# Patient Record
Sex: FEMALE | Race: WHITE | NOT HISPANIC OR LATINO | Employment: OTHER | URBAN - NONMETROPOLITAN AREA
[De-identification: names, ages, dates, MRNs, and addresses within clinical notes are randomized per-mention and may not be internally consistent; named-entity substitution may affect disease eponyms.]

---

## 2018-05-22 ENCOUNTER — APPOINTMENT (OUTPATIENT)
Dept: GENERAL RADIOLOGY | Facility: HOSPITAL | Age: 63
End: 2018-05-22

## 2018-05-22 ENCOUNTER — HOSPITAL ENCOUNTER (EMERGENCY)
Facility: HOSPITAL | Age: 63
Discharge: HOME | End: 2018-05-23
Attending: EMERGENCY MEDICINE | Admitting: EMERGENCY MEDICINE

## 2018-05-22 DIAGNOSIS — I15.9 SECONDARY HYPERTENSION: Primary | ICD-10-CM

## 2018-05-22 LAB
ALBUMIN SERPL-MCNC: 4.3 G/DL (ref 3.5–5)
ALBUMIN/GLOB SERPL: 1.4 G/DL (ref 1–2)
ALP SERPL-CCNC: 82 U/L (ref 38–126)
ALT SERPL W P-5'-P-CCNC: 23 U/L (ref 13–69)
ANION GAP SERPL CALCULATED.3IONS-SCNC: 13.5 MMOL/L (ref 10–20)
AST SERPL-CCNC: 23 U/L (ref 15–46)
BASOPHILS # BLD AUTO: 0.04 10*3/MM3 (ref 0–0.2)
BASOPHILS NFR BLD AUTO: 0.4 % (ref 0–2.5)
BILIRUB SERPL-MCNC: 0.6 MG/DL (ref 0.2–1.3)
BUN BLD-MCNC: 16 MG/DL (ref 7–20)
BUN/CREAT SERPL: 14.5 (ref 7.1–23.5)
CALCIUM SPEC-SCNC: 9.6 MG/DL (ref 8.4–10.2)
CHLORIDE SERPL-SCNC: 105 MMOL/L (ref 98–107)
CO2 SERPL-SCNC: 27 MMOL/L (ref 26–30)
CREAT BLD-MCNC: 1.1 MG/DL (ref 0.6–1.3)
DEPRECATED RDW RBC AUTO: 43.4 FL (ref 37–54)
EOSINOPHIL # BLD AUTO: 0.09 10*3/MM3 (ref 0–0.7)
EOSINOPHIL NFR BLD AUTO: 1 % (ref 0–7)
ERYTHROCYTE [DISTWIDTH] IN BLOOD BY AUTOMATED COUNT: 13.4 % (ref 11.5–14.5)
GFR SERPL CREATININE-BSD FRML MDRD: 50 ML/MIN/1.73
GFR SERPL CREATININE-BSD FRML MDRD: 61 ML/MIN/1.73
GLOBULIN UR ELPH-MCNC: 3 GM/DL
GLUCOSE BLD-MCNC: 125 MG/DL (ref 74–98)
HCT VFR BLD AUTO: 37.2 % (ref 37–47)
HGB BLD-MCNC: 12.6 G/DL (ref 12–16)
IMM GRANULOCYTES # BLD: 0.03 10*3/MM3 (ref 0–0.06)
IMM GRANULOCYTES NFR BLD: 0.3 % (ref 0–0.6)
LYMPHOCYTES # BLD AUTO: 2.41 10*3/MM3 (ref 0.6–3.4)
LYMPHOCYTES NFR BLD AUTO: 26.9 % (ref 10–50)
MCH RBC QN AUTO: 29.9 PG (ref 27–31)
MCHC RBC AUTO-ENTMCNC: 33.9 G/DL (ref 30–37)
MCV RBC AUTO: 88.2 FL (ref 81–99)
MONOCYTES # BLD AUTO: 0.8 10*3/MM3 (ref 0–0.9)
MONOCYTES NFR BLD AUTO: 8.9 % (ref 0–12)
NEUTROPHILS # BLD AUTO: 5.6 10*3/MM3 (ref 2–6.9)
NEUTROPHILS NFR BLD AUTO: 62.5 % (ref 37–80)
NRBC BLD MANUAL-RTO: 0 /100 WBC (ref 0–0)
PLATELET # BLD AUTO: 224 10*3/MM3 (ref 130–400)
PMV BLD AUTO: 9.5 FL (ref 6–12)
POTASSIUM BLD-SCNC: 3.5 MMOL/L (ref 3.5–5.1)
PROT SERPL-MCNC: 7.3 G/DL (ref 6.3–8.2)
RBC # BLD AUTO: 4.22 10*6/MM3 (ref 4.2–5.4)
SODIUM BLD-SCNC: 142 MMOL/L (ref 137–145)
TROPONIN I SERPL-MCNC: 0 NG/ML (ref 0–0.05)
TROPONIN I SERPL-MCNC: <0.012 NG/ML (ref 0–0.03)
WBC NRBC COR # BLD: 8.97 10*3/MM3 (ref 4.8–10.8)

## 2018-05-22 PROCEDURE — 80053 COMPREHEN METABOLIC PANEL: CPT

## 2018-05-22 PROCEDURE — 71045 X-RAY EXAM CHEST 1 VIEW: CPT

## 2018-05-22 PROCEDURE — 93005 ELECTROCARDIOGRAM TRACING: CPT | Performed by: EMERGENCY MEDICINE

## 2018-05-22 PROCEDURE — 84484 ASSAY OF TROPONIN QUANT: CPT

## 2018-05-22 PROCEDURE — 99285 EMERGENCY DEPT VISIT HI MDM: CPT

## 2018-05-22 PROCEDURE — 85025 COMPLETE CBC W/AUTO DIFF WBC: CPT

## 2018-05-22 RX ORDER — SODIUM CHLORIDE 0.9 % (FLUSH) 0.9 %
10 SYRINGE (ML) INJECTION AS NEEDED
Status: DISCONTINUED | OUTPATIENT
Start: 2018-05-22 | End: 2018-05-23

## 2018-05-22 RX ORDER — AMLODIPINE BESYLATE 5 MG/1
5 TABLET ORAL
Status: DISCONTINUED | OUTPATIENT
Start: 2018-05-23 | End: 2018-05-22

## 2018-05-22 RX ORDER — AMLODIPINE BESYLATE 5 MG/1
5 TABLET ORAL ONCE
Status: COMPLETED | OUTPATIENT
Start: 2018-05-22 | End: 2018-05-22

## 2018-05-22 RX ORDER — LISINOPRIL 10 MG/1
10 TABLET ORAL DAILY
COMMUNITY
End: 2018-05-23

## 2018-05-22 RX ORDER — METOPROLOL SUCCINATE 50 MG/1
50 TABLET, EXTENDED RELEASE ORAL DAILY
COMMUNITY
End: 2018-05-23

## 2018-05-22 RX ADMIN — AMLODIPINE BESYLATE 5 MG: 5 TABLET ORAL at 23:18

## 2018-05-23 VITALS
BODY MASS INDEX: 33.54 KG/M2 | HEIGHT: 71 IN | SYSTOLIC BLOOD PRESSURE: 163 MMHG | RESPIRATION RATE: 18 BRPM | DIASTOLIC BLOOD PRESSURE: 92 MMHG | OXYGEN SATURATION: 96 % | WEIGHT: 239.6 LBS | HEART RATE: 85 BPM | TEMPERATURE: 98.5 F

## 2018-05-23 LAB
HOLD SPECIMEN: NORMAL
HOLD SPECIMEN: NORMAL
WHOLE BLOOD HOLD SPECIMEN: NORMAL
WHOLE BLOOD HOLD SPECIMEN: NORMAL

## 2018-08-21 ENCOUNTER — TRANSCRIBE ORDERS (OUTPATIENT)
Dept: ADMINISTRATIVE | Facility: HOSPITAL | Age: 63
End: 2018-08-21

## 2018-08-21 ENCOUNTER — APPOINTMENT (OUTPATIENT)
Dept: LAB | Facility: HOSPITAL | Age: 63
End: 2018-08-21

## 2018-08-21 DIAGNOSIS — I13.10 BENIGN HYPERTENSIVE HEART AND RENAL DISEASE: Primary | ICD-10-CM

## 2018-08-21 LAB
ALBUMIN SERPL-MCNC: 4 G/DL (ref 3.5–5)
ANION GAP SERPL CALCULATED.3IONS-SCNC: 13.4 MMOL/L (ref 10–20)
BACTERIA UR QL AUTO: ABNORMAL /HPF
BASOPHILS # BLD AUTO: 0.04 10*3/MM3 (ref 0–0.2)
BASOPHILS NFR BLD AUTO: 0.8 % (ref 0–2.5)
BILIRUB UR QL STRIP: NEGATIVE
BUN BLD-MCNC: 19 MG/DL (ref 7–20)
BUN/CREAT SERPL: 19 (ref 7.1–23.5)
CALCIUM SPEC-SCNC: 9.3 MG/DL (ref 8.4–10.2)
CHLORIDE SERPL-SCNC: 104 MMOL/L (ref 98–107)
CLARITY UR: CLEAR
CO2 SERPL-SCNC: 29 MMOL/L (ref 26–30)
COLOR UR: YELLOW
CREAT BLD-MCNC: 1 MG/DL (ref 0.6–1.3)
CREAT UR-MCNC: 21.6 MG/DL
DEPRECATED RDW RBC AUTO: 42.9 FL (ref 37–54)
EOSINOPHIL # BLD AUTO: 0.13 10*3/MM3 (ref 0–0.7)
EOSINOPHIL NFR BLD AUTO: 2.6 % (ref 0–7)
ERYTHROCYTE [DISTWIDTH] IN BLOOD BY AUTOMATED COUNT: 13 % (ref 11.5–14.5)
GFR SERPL CREATININE-BSD FRML MDRD: 56 ML/MIN/1.73
GLUCOSE BLD-MCNC: 84 MG/DL (ref 74–98)
GLUCOSE UR STRIP-MCNC: NEGATIVE MG/DL
HCT VFR BLD AUTO: 38.8 % (ref 37–47)
HGB BLD-MCNC: 12.8 G/DL (ref 12–16)
HGB UR QL STRIP.AUTO: ABNORMAL
HYALINE CASTS UR QL AUTO: ABNORMAL /LPF
IMM GRANULOCYTES # BLD: 0.02 10*3/MM3 (ref 0–0.06)
IMM GRANULOCYTES NFR BLD: 0.4 % (ref 0–0.6)
KETONES UR QL STRIP: NEGATIVE
LEUKOCYTE ESTERASE UR QL STRIP.AUTO: ABNORMAL
LYMPHOCYTES # BLD AUTO: 1.76 10*3/MM3 (ref 0.6–3.4)
LYMPHOCYTES NFR BLD AUTO: 35.1 % (ref 10–50)
MCH RBC QN AUTO: 29.9 PG (ref 27–31)
MCHC RBC AUTO-ENTMCNC: 33 G/DL (ref 30–37)
MCV RBC AUTO: 90.7 FL (ref 81–99)
MONOCYTES # BLD AUTO: 0.49 10*3/MM3 (ref 0–0.9)
MONOCYTES NFR BLD AUTO: 9.8 % (ref 0–12)
NEUTROPHILS # BLD AUTO: 2.58 10*3/MM3 (ref 2–6.9)
NEUTROPHILS NFR BLD AUTO: 51.3 % (ref 37–80)
NITRITE UR QL STRIP: NEGATIVE
NRBC BLD MANUAL-RTO: 0 /100 WBC (ref 0–0)
PH UR STRIP.AUTO: 5.5 [PH] (ref 5–8)
PHOSPHATE SERPL-MCNC: 3.6 MG/DL (ref 2.5–4.5)
PLATELET # BLD AUTO: 219 10*3/MM3 (ref 130–400)
PMV BLD AUTO: 9.3 FL (ref 6–12)
POTASSIUM BLD-SCNC: 4.4 MMOL/L (ref 3.5–5.1)
PROT UR QL STRIP: NEGATIVE
PROT UR-MCNC: 13 MG/DL (ref 0–11.9)
RBC # BLD AUTO: 4.28 10*6/MM3 (ref 4.2–5.4)
RBC # UR: ABNORMAL /HPF
REF LAB TEST METHOD: ABNORMAL
SODIUM BLD-SCNC: 142 MMOL/L (ref 137–145)
SP GR UR STRIP: <=1.005 (ref 1–1.03)
SQUAMOUS #/AREA URNS HPF: ABNORMAL /HPF
UROBILINOGEN UR QL STRIP: ABNORMAL
WBC NRBC COR # BLD: 5.02 10*3/MM3 (ref 4.8–10.8)
WBC UR QL AUTO: ABNORMAL /HPF

## 2018-08-21 PROCEDURE — 82570 ASSAY OF URINE CREATININE: CPT | Performed by: INTERNAL MEDICINE

## 2018-08-21 PROCEDURE — 84156 ASSAY OF PROTEIN URINE: CPT | Performed by: INTERNAL MEDICINE

## 2018-08-21 PROCEDURE — 85025 COMPLETE CBC W/AUTO DIFF WBC: CPT | Performed by: INTERNAL MEDICINE

## 2018-08-21 PROCEDURE — 36415 COLL VENOUS BLD VENIPUNCTURE: CPT | Performed by: INTERNAL MEDICINE

## 2018-08-21 PROCEDURE — 81001 URINALYSIS AUTO W/SCOPE: CPT | Performed by: INTERNAL MEDICINE

## 2018-08-21 PROCEDURE — 80069 RENAL FUNCTION PANEL: CPT | Performed by: INTERNAL MEDICINE

## 2018-10-29 ENCOUNTER — HOSPITAL ENCOUNTER (EMERGENCY)
Facility: HOSPITAL | Age: 63
Discharge: HOME | End: 2018-10-29
Attending: EMERGENCY MEDICINE | Admitting: EMERGENCY MEDICINE

## 2018-10-29 ENCOUNTER — APPOINTMENT (OUTPATIENT)
Dept: CT IMAGING | Facility: HOSPITAL | Age: 63
End: 2018-10-29
Attending: EMERGENCY MEDICINE

## 2018-10-29 VITALS
OXYGEN SATURATION: 97 % | BODY MASS INDEX: 34.5 KG/M2 | SYSTOLIC BLOOD PRESSURE: 147 MMHG | HEIGHT: 70 IN | WEIGHT: 241 LBS | RESPIRATION RATE: 18 BRPM | DIASTOLIC BLOOD PRESSURE: 80 MMHG | TEMPERATURE: 99 F | HEART RATE: 84 BPM

## 2018-10-29 DIAGNOSIS — K04.7 DENTAL ABSCESS: Primary | ICD-10-CM

## 2018-10-29 LAB
ALBUMIN SERPL-MCNC: 4.3 G/DL (ref 3.5–5)
ALBUMIN/GLOB SERPL: 1.3 G/DL (ref 1–2)
ALP SERPL-CCNC: 80 U/L (ref 38–126)
ALT SERPL W P-5'-P-CCNC: 24 U/L (ref 13–69)
ANION GAP SERPL CALCULATED.3IONS-SCNC: 10.8 MMOL/L (ref 10–20)
AST SERPL-CCNC: 23 U/L (ref 15–46)
BASOPHILS # BLD AUTO: 0.04 10*3/MM3 (ref 0–0.2)
BASOPHILS NFR BLD AUTO: 0.5 % (ref 0–2.5)
BILIRUB SERPL-MCNC: 1.2 MG/DL (ref 0.2–1.3)
BUN BLD-MCNC: 13 MG/DL (ref 7–20)
BUN/CREAT SERPL: 11.8 (ref 7.1–23.5)
CALCIUM SPEC-SCNC: 9.4 MG/DL (ref 8.4–10.2)
CHLORIDE SERPL-SCNC: 104 MMOL/L (ref 98–107)
CO2 SERPL-SCNC: 28 MMOL/L (ref 26–30)
CREAT BLD-MCNC: 1.1 MG/DL (ref 0.6–1.3)
DEPRECATED RDW RBC AUTO: 43.3 FL (ref 37–54)
EOSINOPHIL # BLD AUTO: 0.15 10*3/MM3 (ref 0–0.7)
EOSINOPHIL NFR BLD AUTO: 1.9 % (ref 0–7)
ERYTHROCYTE [DISTWIDTH] IN BLOOD BY AUTOMATED COUNT: 13.3 % (ref 11.5–14.5)
GFR SERPL CREATININE-BSD FRML MDRD: 50 ML/MIN/1.73
GLOBULIN UR ELPH-MCNC: 3.3 GM/DL
GLUCOSE BLD-MCNC: 115 MG/DL (ref 74–98)
HCT VFR BLD AUTO: 37.5 % (ref 37–47)
HGB BLD-MCNC: 12.3 G/DL (ref 12–16)
IMM GRANULOCYTES # BLD: 0.04 10*3/MM3 (ref 0–0.06)
IMM GRANULOCYTES NFR BLD: 0.5 % (ref 0–0.6)
LYMPHOCYTES # BLD AUTO: 1.75 10*3/MM3 (ref 0.6–3.4)
LYMPHOCYTES NFR BLD AUTO: 21.9 % (ref 10–50)
MCH RBC QN AUTO: 29.1 PG (ref 27–31)
MCHC RBC AUTO-ENTMCNC: 32.8 G/DL (ref 30–37)
MCV RBC AUTO: 88.9 FL (ref 81–99)
MONOCYTES # BLD AUTO: 0.92 10*3/MM3 (ref 0–0.9)
MONOCYTES NFR BLD AUTO: 11.5 % (ref 0–12)
NEUTROPHILS # BLD AUTO: 5.09 10*3/MM3 (ref 2–6.9)
NEUTROPHILS NFR BLD AUTO: 63.7 % (ref 37–80)
NRBC BLD MANUAL-RTO: 0 /100 WBC (ref 0–0)
PLATELET # BLD AUTO: 203 10*3/MM3 (ref 130–400)
PMV BLD AUTO: 9.3 FL (ref 6–12)
POTASSIUM BLD-SCNC: 3.8 MMOL/L (ref 3.5–5.1)
PROT SERPL-MCNC: 7.6 G/DL (ref 6.3–8.2)
RBC # BLD AUTO: 4.22 10*6/MM3 (ref 4.2–5.4)
SODIUM BLD-SCNC: 139 MMOL/L (ref 137–145)
WBC NRBC COR # BLD: 7.99 10*3/MM3 (ref 4.8–10.8)

## 2018-10-29 PROCEDURE — 25010000002 KETOROLAC TROMETHAMINE PER 15 MG: Performed by: EMERGENCY MEDICINE

## 2018-10-29 PROCEDURE — 96365 THER/PROPH/DIAG IV INF INIT: CPT

## 2018-10-29 PROCEDURE — 70487 CT MAXILLOFACIAL W/DYE: CPT

## 2018-10-29 PROCEDURE — 25010000002 IOPAMIDOL 61 % SOLUTION: Performed by: EMERGENCY MEDICINE

## 2018-10-29 PROCEDURE — 25010000002 DEXAMETHASONE PER 1 MG: Performed by: EMERGENCY MEDICINE

## 2018-10-29 PROCEDURE — 96361 HYDRATE IV INFUSION ADD-ON: CPT

## 2018-10-29 PROCEDURE — 85025 COMPLETE CBC W/AUTO DIFF WBC: CPT | Performed by: EMERGENCY MEDICINE

## 2018-10-29 PROCEDURE — 96375 TX/PRO/DX INJ NEW DRUG ADDON: CPT

## 2018-10-29 PROCEDURE — 80053 COMPREHEN METABOLIC PANEL: CPT | Performed by: EMERGENCY MEDICINE

## 2018-10-29 PROCEDURE — 99284 EMERGENCY DEPT VISIT MOD MDM: CPT

## 2018-10-29 RX ORDER — CLINDAMYCIN HYDROCHLORIDE 150 MG/1
450 CAPSULE ORAL 3 TIMES DAILY
Qty: 90 CAPSULE | Refills: 0 | Status: SHIPPED | OUTPATIENT
Start: 2018-10-29 | End: 2018-11-08

## 2018-10-29 RX ORDER — LISINOPRIL 10 MG/1
10 TABLET ORAL 2 TIMES DAILY
COMMUNITY
End: 2021-04-14 | Stop reason: SDUPTHER

## 2018-10-29 RX ORDER — KETOROLAC TROMETHAMINE 30 MG/ML
15 INJECTION, SOLUTION INTRAMUSCULAR; INTRAVENOUS ONCE
Status: COMPLETED | OUTPATIENT
Start: 2018-10-29 | End: 2018-10-29

## 2018-10-29 RX ORDER — CARVEDILOL 25 MG/1
25 TABLET ORAL 2 TIMES DAILY WITH MEALS
COMMUNITY
End: 2021-04-14 | Stop reason: SDUPTHER

## 2018-10-29 RX ORDER — DEXAMETHASONE SODIUM PHOSPHATE 4 MG/ML
10 INJECTION, SOLUTION INTRA-ARTICULAR; INTRALESIONAL; INTRAMUSCULAR; INTRAVENOUS; SOFT TISSUE ONCE
Status: COMPLETED | OUTPATIENT
Start: 2018-10-29 | End: 2018-10-29

## 2018-10-29 RX ORDER — CLINDAMYCIN PHOSPHATE 600 MG/50ML
600 INJECTION, SOLUTION INTRAVENOUS ONCE
Status: COMPLETED | OUTPATIENT
Start: 2018-10-29 | End: 2018-10-29

## 2018-10-29 RX ADMIN — CLINDAMYCIN PHOSPHATE 600 MG: 600 INJECTION, SOLUTION INTRAVENOUS at 11:07

## 2018-10-29 RX ADMIN — KETOROLAC TROMETHAMINE 15 MG: 30 INJECTION, SOLUTION INTRAMUSCULAR at 11:05

## 2018-10-29 RX ADMIN — DEXAMETHASONE SODIUM PHOSPHATE 10 MG: 4 INJECTION, SOLUTION INTRA-ARTICULAR; INTRALESIONAL; INTRAMUSCULAR; INTRAVENOUS; SOFT TISSUE at 11:05

## 2018-10-29 RX ADMIN — IOPAMIDOL 100 ML: 612 INJECTION, SOLUTION INTRAVENOUS at 12:07

## 2018-10-29 RX ADMIN — SODIUM CHLORIDE 1000 ML: 9 INJECTION, SOLUTION INTRAVENOUS at 11:03

## 2018-11-16 ENCOUNTER — TRANSCRIBE ORDERS (OUTPATIENT)
Dept: ULTRASOUND IMAGING | Facility: HOSPITAL | Age: 63
End: 2018-11-16

## 2018-11-16 DIAGNOSIS — I10 ESSENTIAL HYPERTENSION, MALIGNANT: Primary | ICD-10-CM

## 2018-11-16 DIAGNOSIS — N18.2 CHRONIC KIDNEY DISEASE, STAGE II (MILD): ICD-10-CM

## 2018-12-05 ENCOUNTER — HOSPITAL ENCOUNTER (OUTPATIENT)
Dept: ULTRASOUND IMAGING | Facility: HOSPITAL | Age: 63
Discharge: HOME | End: 2018-12-05
Admitting: INTERNAL MEDICINE

## 2018-12-05 DIAGNOSIS — I10 ESSENTIAL HYPERTENSION, MALIGNANT: ICD-10-CM

## 2018-12-05 DIAGNOSIS — N18.2 CHRONIC KIDNEY DISEASE, STAGE II (MILD): ICD-10-CM

## 2018-12-05 PROCEDURE — 76775 US EXAM ABDO BACK WALL LIM: CPT

## 2019-08-05 ENCOUNTER — HOSPITAL ENCOUNTER (EMERGENCY)
Facility: HOSPITAL | Age: 64
Discharge: HOME | End: 2019-08-05
Attending: EMERGENCY MEDICINE | Admitting: EMERGENCY MEDICINE

## 2019-08-05 ENCOUNTER — APPOINTMENT (OUTPATIENT)
Dept: GENERAL RADIOLOGY | Facility: HOSPITAL | Age: 64
End: 2019-08-05

## 2019-08-05 VITALS
BODY MASS INDEX: 36.05 KG/M2 | HEART RATE: 88 BPM | RESPIRATION RATE: 17 BRPM | DIASTOLIC BLOOD PRESSURE: 82 MMHG | WEIGHT: 251.8 LBS | SYSTOLIC BLOOD PRESSURE: 150 MMHG | HEIGHT: 70 IN | TEMPERATURE: 97.8 F | OXYGEN SATURATION: 98 %

## 2019-08-05 DIAGNOSIS — S20.211A RIB CONTUSION, RIGHT, INITIAL ENCOUNTER: Primary | ICD-10-CM

## 2019-08-05 PROCEDURE — 71101 X-RAY EXAM UNILAT RIBS/CHEST: CPT

## 2019-08-05 PROCEDURE — 99283 EMERGENCY DEPT VISIT LOW MDM: CPT

## 2019-08-05 RX ORDER — TRAMADOL HYDROCHLORIDE 50 MG/1
50 TABLET ORAL EVERY 6 HOURS PRN
Qty: 12 TABLET | Refills: 0 | Status: SHIPPED | OUTPATIENT
Start: 2019-08-05 | End: 2019-08-10 | Stop reason: HOSPADM

## 2019-08-05 RX ORDER — HYDROCODONE BITARTRATE AND ACETAMINOPHEN 5; 325 MG/1; MG/1
1 TABLET ORAL ONCE
Status: COMPLETED | OUTPATIENT
Start: 2019-08-05 | End: 2019-08-05

## 2019-08-05 RX ADMIN — HYDROCODONE BITARTRATE AND ACETAMINOPHEN 1 TABLET: 5; 325 TABLET ORAL at 13:36

## 2019-08-08 ENCOUNTER — APPOINTMENT (OUTPATIENT)
Dept: CT IMAGING | Facility: HOSPITAL | Age: 64
End: 2019-08-08

## 2019-08-08 ENCOUNTER — HOSPITAL ENCOUNTER (INPATIENT)
Facility: HOSPITAL | Age: 64
LOS: 2 days | Discharge: HOME | End: 2019-08-10
Attending: EMERGENCY MEDICINE | Admitting: INTERNAL MEDICINE

## 2019-08-08 ENCOUNTER — APPOINTMENT (OUTPATIENT)
Dept: GENERAL RADIOLOGY | Facility: HOSPITAL | Age: 64
End: 2019-08-08

## 2019-08-08 ENCOUNTER — APPOINTMENT (OUTPATIENT)
Dept: NUCLEAR MEDICINE | Facility: HOSPITAL | Age: 64
End: 2019-08-08

## 2019-08-08 ENCOUNTER — APPOINTMENT (OUTPATIENT)
Dept: CARDIOLOGY | Facility: HOSPITAL | Age: 64
End: 2019-08-08

## 2019-08-08 DIAGNOSIS — N17.9 ACUTE KIDNEY INJURY (HCC): ICD-10-CM

## 2019-08-08 DIAGNOSIS — I21.4 NSTEMI (NON-ST ELEVATED MYOCARDIAL INFARCTION) (HCC): Primary | ICD-10-CM

## 2019-08-08 PROBLEM — J18.9 PNEUMONIA OF RIGHT MIDDLE LOBE DUE TO INFECTIOUS ORGANISM: Status: ACTIVE | Noted: 2019-08-08

## 2019-08-08 LAB
ALBUMIN SERPL-MCNC: 3.5 G/DL (ref 3.5–5.2)
ALBUMIN/GLOB SERPL: 0.9 G/DL
ALP SERPL-CCNC: 95 U/L (ref 39–117)
ALT SERPL W P-5'-P-CCNC: 8 U/L (ref 1–33)
ANION GAP SERPL CALCULATED.3IONS-SCNC: 19.7 MMOL/L (ref 5–15)
AST SERPL-CCNC: 17 U/L (ref 1–32)
BASOPHILS # BLD AUTO: 0.02 10*3/MM3 (ref 0–0.2)
BASOPHILS NFR BLD AUTO: 0.1 % (ref 0–1.5)
BILIRUB SERPL-MCNC: 1.8 MG/DL (ref 0.2–1.2)
BUN BLD-MCNC: 35 MG/DL (ref 8–23)
BUN/CREAT SERPL: 18.4 (ref 7–25)
CALCIUM SPEC-SCNC: 9.4 MG/DL (ref 8.6–10.5)
CHLORIDE SERPL-SCNC: 99 MMOL/L (ref 98–107)
CK SERPL-CCNC: 66 U/L (ref 20–180)
CO2 SERPL-SCNC: 20.8 MMOL/L (ref 22–29)
CREAT BLD-MCNC: 1.9 MG/DL (ref 0.57–1)
D DIMER PPP FEU-MCNC: 5.1 MCGFEU/ML (ref 0–0.57)
D-LACTATE SERPL-SCNC: 2.7 MMOL/L (ref 0.5–2)
DEPRECATED RDW RBC AUTO: 41.5 FL (ref 37–54)
EOSINOPHIL # BLD AUTO: 0.04 10*3/MM3 (ref 0–0.4)
EOSINOPHIL NFR BLD AUTO: 0.3 % (ref 0.3–6.2)
ERYTHROCYTE [DISTWIDTH] IN BLOOD BY AUTOMATED COUNT: 12.8 % (ref 12.3–15.4)
GFR SERPL CREATININE-BSD FRML MDRD: 27 ML/MIN/1.73
GLOBULIN UR ELPH-MCNC: 4 GM/DL
GLUCOSE BLD-MCNC: 156 MG/DL (ref 65–99)
HBA1C MFR BLD: 5.3 % (ref 4.8–5.6)
HCT VFR BLD AUTO: 35.4 % (ref 34–46.6)
HGB BLD-MCNC: 11.6 G/DL (ref 12–15.9)
HOLD SPECIMEN: NORMAL
IMM GRANULOCYTES # BLD AUTO: 0.1 10*3/MM3 (ref 0–0.05)
IMM GRANULOCYTES NFR BLD AUTO: 0.7 % (ref 0–0.5)
LYMPHOCYTES # BLD AUTO: 1.58 10*3/MM3 (ref 0.7–3.1)
LYMPHOCYTES NFR BLD AUTO: 11.7 % (ref 19.6–45.3)
MAXIMAL PREDICTED HEART RATE: 158 BPM
MCH RBC QN AUTO: 28.6 PG (ref 26.6–33)
MCHC RBC AUTO-ENTMCNC: 32.8 G/DL (ref 31.5–35.7)
MCV RBC AUTO: 87.4 FL (ref 79–97)
MONOCYTES # BLD AUTO: 1.42 10*3/MM3 (ref 0.1–0.9)
MONOCYTES NFR BLD AUTO: 10.5 % (ref 5–12)
NEUTROPHILS # BLD AUTO: 10.35 10*3/MM3 (ref 1.7–7)
NEUTROPHILS NFR BLD AUTO: 76.7 % (ref 42.7–76)
NRBC BLD AUTO-RTO: 0 /100 WBC (ref 0–0.2)
NT-PROBNP SERPL-MCNC: 9384 PG/ML (ref 5–900)
PLATELET # BLD AUTO: 231 10*3/MM3 (ref 140–450)
PMV BLD AUTO: 9.6 FL (ref 6–12)
POTASSIUM BLD-SCNC: 4.5 MMOL/L (ref 3.5–5.2)
PROCALCITONIN SERPL-MCNC: 1.26 NG/ML (ref 0.1–0.25)
PROT SERPL-MCNC: 7.5 G/DL (ref 6–8.5)
RBC # BLD AUTO: 4.05 10*6/MM3 (ref 3.77–5.28)
SODIUM BLD-SCNC: 135 MMOL/L (ref 136–145)
STRESS TARGET HR: 134 BPM
TROPONIN I SERPL-MCNC: 0.8 NG/ML (ref 0–0.05)
TROPONIN T SERPL-MCNC: 0.1 NG/ML (ref 0–0.03)
TROPONIN T SERPL-MCNC: 0.16 NG/ML (ref 0–0.03)
TSH SERPL DL<=0.05 MIU/L-ACNC: 2.99 MIU/ML (ref 0.27–4.2)
WBC NRBC COR # BLD: 13.51 10*3/MM3 (ref 3.4–10.8)
WHOLE BLOOD HOLD SPECIMEN: NORMAL
WHOLE BLOOD HOLD SPECIMEN: NORMAL

## 2019-08-08 PROCEDURE — 93005 ELECTROCARDIOGRAM TRACING: CPT

## 2019-08-08 PROCEDURE — 0 TECHNETIUM ALBUMIN AGGREGATED: Performed by: INTERNAL MEDICINE

## 2019-08-08 PROCEDURE — 71046 X-RAY EXAM CHEST 2 VIEWS: CPT

## 2019-08-08 PROCEDURE — 87040 BLOOD CULTURE FOR BACTERIA: CPT | Performed by: FAMILY MEDICINE

## 2019-08-08 PROCEDURE — 25010000002 FENTANYL CITRATE (PF) 100 MCG/2ML SOLUTION: Performed by: EMERGENCY MEDICINE

## 2019-08-08 PROCEDURE — 85025 COMPLETE CBC W/AUTO DIFF WBC: CPT

## 2019-08-08 PROCEDURE — 99284 EMERGENCY DEPT VISIT MOD MDM: CPT

## 2019-08-08 PROCEDURE — 83605 ASSAY OF LACTIC ACID: CPT | Performed by: INTERNAL MEDICINE

## 2019-08-08 PROCEDURE — A9540 TC99M MAA: HCPCS | Performed by: INTERNAL MEDICINE

## 2019-08-08 PROCEDURE — 25010000002 ENOXAPARIN PER 10 MG: Performed by: EMERGENCY MEDICINE

## 2019-08-08 PROCEDURE — 94799 UNLISTED PULMONARY SVC/PX: CPT

## 2019-08-08 PROCEDURE — 80053 COMPREHEN METABOLIC PANEL: CPT

## 2019-08-08 PROCEDURE — 84145 PROCALCITONIN (PCT): CPT | Performed by: INTERNAL MEDICINE

## 2019-08-08 PROCEDURE — 84484 ASSAY OF TROPONIN QUANT: CPT | Performed by: INTERNAL MEDICINE

## 2019-08-08 PROCEDURE — 99223 1ST HOSP IP/OBS HIGH 75: CPT | Performed by: INTERNAL MEDICINE

## 2019-08-08 PROCEDURE — 71250 CT THORAX DX C-: CPT

## 2019-08-08 PROCEDURE — 93306 TTE W/DOPPLER COMPLETE: CPT

## 2019-08-08 PROCEDURE — 82550 ASSAY OF CK (CPK): CPT | Performed by: INTERNAL MEDICINE

## 2019-08-08 PROCEDURE — 84443 ASSAY THYROID STIM HORMONE: CPT | Performed by: INTERNAL MEDICINE

## 2019-08-08 PROCEDURE — 78582 LUNG VENTILAT&PERFUS IMAGING: CPT

## 2019-08-08 PROCEDURE — 94640 AIRWAY INHALATION TREATMENT: CPT

## 2019-08-08 PROCEDURE — 25010000002 LEVOFLOXACIN PER 250 MG: Performed by: INTERNAL MEDICINE

## 2019-08-08 PROCEDURE — 85379 FIBRIN DEGRADATION QUANT: CPT | Performed by: INTERNAL MEDICINE

## 2019-08-08 PROCEDURE — 84484 ASSAY OF TROPONIN QUANT: CPT

## 2019-08-08 PROCEDURE — 83880 ASSAY OF NATRIURETIC PEPTIDE: CPT | Performed by: INTERNAL MEDICINE

## 2019-08-08 PROCEDURE — 83036 HEMOGLOBIN GLYCOSYLATED A1C: CPT | Performed by: INTERNAL MEDICINE

## 2019-08-08 RX ORDER — SODIUM CHLORIDE 9 MG/ML
100 INJECTION, SOLUTION INTRAVENOUS CONTINUOUS
Status: DISCONTINUED | OUTPATIENT
Start: 2019-08-08 | End: 2019-08-10

## 2019-08-08 RX ORDER — BENZONATATE 100 MG/1
100 CAPSULE ORAL 3 TIMES DAILY PRN
Status: DISCONTINUED | OUTPATIENT
Start: 2019-08-08 | End: 2019-08-10 | Stop reason: HOSPADM

## 2019-08-08 RX ORDER — SODIUM CHLORIDE FOR INHALATION 3 %
4 VIAL, NEBULIZER (ML) INHALATION ONCE
Status: COMPLETED | OUTPATIENT
Start: 2019-08-08 | End: 2019-08-08

## 2019-08-08 RX ORDER — ONDANSETRON 2 MG/ML
4 INJECTION INTRAMUSCULAR; INTRAVENOUS EVERY 6 HOURS PRN
Status: DISCONTINUED | OUTPATIENT
Start: 2019-08-08 | End: 2019-08-10 | Stop reason: HOSPADM

## 2019-08-08 RX ORDER — ASCORBIC ACID 500 MG
500 TABLET ORAL DAILY
Status: DISCONTINUED | OUTPATIENT
Start: 2019-08-09 | End: 2019-08-10 | Stop reason: HOSPADM

## 2019-08-08 RX ORDER — SODIUM CHLORIDE 0.9 % (FLUSH) 0.9 %
10 SYRINGE (ML) INJECTION AS NEEDED
Status: DISCONTINUED | OUTPATIENT
Start: 2019-08-08 | End: 2019-08-10 | Stop reason: HOSPADM

## 2019-08-08 RX ORDER — ASPIRIN 325 MG
325 TABLET ORAL ONCE
Status: COMPLETED | OUTPATIENT
Start: 2019-08-08 | End: 2019-08-08

## 2019-08-08 RX ORDER — CHOLECALCIFEROL (VITAMIN D3) 125 MCG
500 CAPSULE ORAL DAILY
Status: DISCONTINUED | OUTPATIENT
Start: 2019-08-09 | End: 2019-08-10 | Stop reason: HOSPADM

## 2019-08-08 RX ORDER — ASPIRIN 81 MG/1
81 TABLET ORAL DAILY
Status: DISCONTINUED | OUTPATIENT
Start: 2019-08-09 | End: 2019-08-10 | Stop reason: HOSPADM

## 2019-08-08 RX ORDER — ONDANSETRON 4 MG/1
4 TABLET, FILM COATED ORAL EVERY 6 HOURS PRN
Status: DISCONTINUED | OUTPATIENT
Start: 2019-08-08 | End: 2019-08-10 | Stop reason: HOSPADM

## 2019-08-08 RX ORDER — ALUMINA, MAGNESIA, AND SIMETHICONE 2400; 2400; 240 MG/30ML; MG/30ML; MG/30ML
15 SUSPENSION ORAL EVERY 6 HOURS PRN
Status: DISCONTINUED | OUTPATIENT
Start: 2019-08-08 | End: 2019-08-10 | Stop reason: HOSPADM

## 2019-08-08 RX ORDER — L.ACID,PARA/B.BIFIDUM/S.THERM 8B CELL
1 CAPSULE ORAL 3 TIMES DAILY
Status: DISCONTINUED | OUTPATIENT
Start: 2019-08-08 | End: 2019-08-10 | Stop reason: HOSPADM

## 2019-08-08 RX ORDER — ASCORBIC ACID 500 MG
500 TABLET ORAL DAILY
COMMUNITY

## 2019-08-08 RX ORDER — CHOLECALCIFEROL (VITAMIN D3) 125 MCG
5 CAPSULE ORAL NIGHTLY PRN
Status: DISCONTINUED | OUTPATIENT
Start: 2019-08-08 | End: 2019-08-10 | Stop reason: HOSPADM

## 2019-08-08 RX ORDER — FENTANYL CITRATE 50 UG/ML
50 INJECTION, SOLUTION INTRAMUSCULAR; INTRAVENOUS ONCE
Status: COMPLETED | OUTPATIENT
Start: 2019-08-08 | End: 2019-08-08

## 2019-08-08 RX ORDER — IPRATROPIUM BROMIDE AND ALBUTEROL SULFATE 2.5; .5 MG/3ML; MG/3ML
3 SOLUTION RESPIRATORY (INHALATION)
Status: DISCONTINUED | OUTPATIENT
Start: 2019-08-08 | End: 2019-08-10 | Stop reason: HOSPADM

## 2019-08-08 RX ORDER — CARVEDILOL 25 MG/1
25 TABLET ORAL 2 TIMES DAILY WITH MEALS
Status: DISCONTINUED | OUTPATIENT
Start: 2019-08-08 | End: 2019-08-10 | Stop reason: HOSPADM

## 2019-08-08 RX ORDER — ACETAMINOPHEN 325 MG/1
650 TABLET ORAL EVERY 4 HOURS PRN
Status: DISCONTINUED | OUTPATIENT
Start: 2019-08-08 | End: 2019-08-10 | Stop reason: HOSPADM

## 2019-08-08 RX ORDER — LEVOFLOXACIN 5 MG/ML
250 INJECTION, SOLUTION INTRAVENOUS EVERY 24 HOURS
Status: DISCONTINUED | OUTPATIENT
Start: 2019-08-09 | End: 2019-08-09

## 2019-08-08 RX ORDER — POLYETHYLENE GLYCOL 3350 17 G/17G
17 POWDER, FOR SOLUTION ORAL DAILY PRN
Status: DISCONTINUED | OUTPATIENT
Start: 2019-08-08 | End: 2019-08-10 | Stop reason: HOSPADM

## 2019-08-08 RX ORDER — CHOLECALCIFEROL (VITAMIN D3) 125 MCG
500 CAPSULE ORAL DAILY
COMMUNITY

## 2019-08-08 RX ORDER — IPRATROPIUM BROMIDE AND ALBUTEROL SULFATE 2.5; .5 MG/3ML; MG/3ML
3 SOLUTION RESPIRATORY (INHALATION) EVERY 4 HOURS PRN
Status: DISCONTINUED | OUTPATIENT
Start: 2019-08-08 | End: 2019-08-10 | Stop reason: HOSPADM

## 2019-08-08 RX ORDER — LEVOFLOXACIN 5 MG/ML
500 INJECTION, SOLUTION INTRAVENOUS ONCE
Status: COMPLETED | OUTPATIENT
Start: 2019-08-08 | End: 2019-08-08

## 2019-08-08 RX ORDER — SODIUM CHLORIDE 0.9 % (FLUSH) 0.9 %
3 SYRINGE (ML) INJECTION EVERY 12 HOURS SCHEDULED
Status: DISCONTINUED | OUTPATIENT
Start: 2019-08-08 | End: 2019-08-10 | Stop reason: HOSPADM

## 2019-08-08 RX ORDER — SODIUM CHLORIDE 0.9 % (FLUSH) 0.9 %
3-10 SYRINGE (ML) INJECTION AS NEEDED
Status: DISCONTINUED | OUTPATIENT
Start: 2019-08-08 | End: 2019-08-10 | Stop reason: HOSPADM

## 2019-08-08 RX ORDER — MELATONIN
1000 DAILY
COMMUNITY

## 2019-08-08 RX ORDER — SODIUM CHLORIDE FOR INHALATION 3 %
4 VIAL, NEBULIZER (ML) INHALATION ONCE
Status: DISCONTINUED | OUTPATIENT
Start: 2019-08-08 | End: 2019-08-08

## 2019-08-08 RX ORDER — LORAZEPAM 0.5 MG/1
0.5 TABLET ORAL EVERY 12 HOURS PRN
Status: DISCONTINUED | OUTPATIENT
Start: 2019-08-08 | End: 2019-08-09

## 2019-08-08 RX ADMIN — SODIUM CHLORIDE, PRESERVATIVE FREE 10 ML: 5 INJECTION INTRAVENOUS at 16:53

## 2019-08-08 RX ADMIN — SODIUM CHLORIDE 100 ML/HR: 9 INJECTION, SOLUTION INTRAVENOUS at 16:52

## 2019-08-08 RX ADMIN — CARVEDILOL 25 MG: 25 TABLET, FILM COATED ORAL at 16:57

## 2019-08-08 RX ADMIN — Medication 1 DOSE: at 16:36

## 2019-08-08 RX ADMIN — FENTANYL CITRATE 50 MCG: 50 INJECTION, SOLUTION INTRAMUSCULAR; INTRAVENOUS at 12:12

## 2019-08-08 RX ADMIN — SODIUM CHLORIDE SOLN NEBU 3% 4 ML: 3 NEBU SOLN at 19:19

## 2019-08-08 RX ADMIN — ASPIRIN 325 MG ORAL TABLET 325 MG: 325 PILL ORAL at 11:54

## 2019-08-08 RX ADMIN — SODIUM CHLORIDE 100 ML/HR: 9 INJECTION, SOLUTION INTRAVENOUS at 19:51

## 2019-08-08 RX ADMIN — Medication 1 CAPSULE: at 21:37

## 2019-08-08 RX ADMIN — IPRATROPIUM BROMIDE AND ALBUTEROL SULFATE 3 ML: .5; 3 SOLUTION RESPIRATORY (INHALATION) at 19:19

## 2019-08-08 RX ADMIN — LEVOFLOXACIN 500 MG: 5 INJECTION, SOLUTION INTRAVENOUS at 16:52

## 2019-08-08 RX ADMIN — SODIUM CHLORIDE 1000 ML: 9 INJECTION, SOLUTION INTRAVENOUS at 18:26

## 2019-08-08 RX ADMIN — ENOXAPARIN SODIUM 110 MG: 120 INJECTION SUBCUTANEOUS at 14:03

## 2019-08-08 RX ADMIN — SODIUM CHLORIDE 1000 ML: 9 INJECTION, SOLUTION INTRAVENOUS at 12:15

## 2019-08-09 ENCOUNTER — APPOINTMENT (OUTPATIENT)
Dept: CT IMAGING | Facility: HOSPITAL | Age: 64
End: 2019-08-09

## 2019-08-09 LAB
ANION GAP SERPL CALCULATED.3IONS-SCNC: 16.3 MMOL/L (ref 5–15)
BASOPHILS # BLD AUTO: 0.03 10*3/MM3 (ref 0–0.2)
BASOPHILS # BLD AUTO: 0.04 10*3/MM3 (ref 0–0.2)
BASOPHILS NFR BLD AUTO: 0.3 % (ref 0–1.5)
BASOPHILS NFR BLD AUTO: 0.4 % (ref 0–1.5)
BUN BLD-MCNC: 25 MG/DL (ref 8–23)
BUN/CREAT SERPL: 21 (ref 7–25)
CALCIUM SPEC-SCNC: 8.8 MG/DL (ref 8.6–10.5)
CHLORIDE SERPL-SCNC: 105 MMOL/L (ref 98–107)
CHOLEST SERPL-MCNC: 134 MG/DL (ref 0–200)
CO2 SERPL-SCNC: 18.7 MMOL/L (ref 22–29)
CREAT BLD-MCNC: 1.19 MG/DL (ref 0.57–1)
D-LACTATE SERPL-SCNC: 1.4 MMOL/L (ref 0.5–2)
DEPRECATED RDW RBC AUTO: 41.9 FL (ref 37–54)
DEPRECATED RDW RBC AUTO: 42 FL (ref 37–54)
EOSINOPHIL # BLD AUTO: 0.07 10*3/MM3 (ref 0–0.4)
EOSINOPHIL # BLD AUTO: 0.08 10*3/MM3 (ref 0–0.4)
EOSINOPHIL NFR BLD AUTO: 0.7 % (ref 0.3–6.2)
EOSINOPHIL NFR BLD AUTO: 0.8 % (ref 0.3–6.2)
ERYTHROCYTE [DISTWIDTH] IN BLOOD BY AUTOMATED COUNT: 12.9 % (ref 12.3–15.4)
ERYTHROCYTE [DISTWIDTH] IN BLOOD BY AUTOMATED COUNT: 12.9 % (ref 12.3–15.4)
ERYTHROCYTE [SEDIMENTATION RATE] IN BLOOD: 96 MM/HR (ref 0–20)
GFR SERPL CREATININE-BSD FRML MDRD: 46 ML/MIN/1.73
GLUCOSE BLD-MCNC: 135 MG/DL (ref 65–99)
HCT VFR BLD AUTO: 26.6 % (ref 34–46.6)
HCT VFR BLD AUTO: 30.6 % (ref 34–46.6)
HDLC SERPL-MCNC: 23 MG/DL (ref 40–60)
HGB BLD-MCNC: 8.5 G/DL (ref 12–15.9)
HGB BLD-MCNC: 9.8 G/DL (ref 12–15.9)
IMM GRANULOCYTES # BLD AUTO: 0.04 10*3/MM3 (ref 0–0.05)
IMM GRANULOCYTES # BLD AUTO: 0.06 10*3/MM3 (ref 0–0.05)
IMM GRANULOCYTES NFR BLD AUTO: 0.4 % (ref 0–0.5)
IMM GRANULOCYTES NFR BLD AUTO: 0.6 % (ref 0–0.5)
LDLC SERPL CALC-MCNC: 90 MG/DL (ref 0–100)
LDLC/HDLC SERPL: 3.93 {RATIO}
LYMPHOCYTES # BLD AUTO: 1.85 10*3/MM3 (ref 0.7–3.1)
LYMPHOCYTES # BLD AUTO: 2.04 10*3/MM3 (ref 0.7–3.1)
LYMPHOCYTES NFR BLD AUTO: 18.2 % (ref 19.6–45.3)
LYMPHOCYTES NFR BLD AUTO: 20.7 % (ref 19.6–45.3)
MCH RBC QN AUTO: 28.4 PG (ref 26.6–33)
MCH RBC QN AUTO: 28.5 PG (ref 26.6–33)
MCHC RBC AUTO-ENTMCNC: 32 G/DL (ref 31.5–35.7)
MCHC RBC AUTO-ENTMCNC: 32 G/DL (ref 31.5–35.7)
MCV RBC AUTO: 89 FL (ref 79–97)
MCV RBC AUTO: 89 FL (ref 79–97)
MONOCYTES # BLD AUTO: 0.98 10*3/MM3 (ref 0.1–0.9)
MONOCYTES # BLD AUTO: 1.05 10*3/MM3 (ref 0.1–0.9)
MONOCYTES NFR BLD AUTO: 10.7 % (ref 5–12)
MONOCYTES NFR BLD AUTO: 9.7 % (ref 5–12)
NEUTROPHILS # BLD AUTO: 6.6 10*3/MM3 (ref 1.7–7)
NEUTROPHILS # BLD AUTO: 7.15 10*3/MM3 (ref 1.7–7)
NEUTROPHILS NFR BLD AUTO: 67.1 % (ref 42.7–76)
NEUTROPHILS NFR BLD AUTO: 70.4 % (ref 42.7–76)
NRBC BLD AUTO-RTO: 0 /100 WBC (ref 0–0.2)
NRBC BLD AUTO-RTO: 0 /100 WBC (ref 0–0.2)
PLATELET # BLD AUTO: 213 10*3/MM3 (ref 140–450)
PLATELET # BLD AUTO: 235 10*3/MM3 (ref 140–450)
PMV BLD AUTO: 10 FL (ref 6–12)
PMV BLD AUTO: 10.2 FL (ref 6–12)
POTASSIUM BLD-SCNC: 4 MMOL/L (ref 3.5–5.2)
PROCALCITONIN SERPL-MCNC: 0.67 NG/ML (ref 0.1–0.25)
RBC # BLD AUTO: 2.99 10*6/MM3 (ref 3.77–5.28)
RBC # BLD AUTO: 3.44 10*6/MM3 (ref 3.77–5.28)
RBC MORPH BLD: NORMAL
SMALL PLATELETS BLD QL SMEAR: ADEQUATE
SODIUM BLD-SCNC: 136 MMOL/L (ref 136–145)
TRIGL SERPL-MCNC: 103 MG/DL (ref 0–150)
TROPONIN T SERPL-MCNC: 0.08 NG/ML (ref 0–0.03)
TROPONIN T SERPL-MCNC: 0.09 NG/ML (ref 0–0.03)
VLDLC SERPL-MCNC: 20.6 MG/DL
WBC MORPH BLD: NORMAL
WBC NRBC COR # BLD: 10.15 10*3/MM3 (ref 3.4–10.8)
WBC NRBC COR # BLD: 9.84 10*3/MM3 (ref 3.4–10.8)

## 2019-08-09 PROCEDURE — 25010000002 IOPAMIDOL 61 % SOLUTION: Performed by: INTERNAL MEDICINE

## 2019-08-09 PROCEDURE — 99152 MOD SED SAME PHYS/QHP 5/>YRS: CPT | Performed by: INTERNAL MEDICINE

## 2019-08-09 PROCEDURE — C1894 INTRO/SHEATH, NON-LASER: HCPCS | Performed by: INTERNAL MEDICINE

## 2019-08-09 PROCEDURE — 84484 ASSAY OF TROPONIN QUANT: CPT | Performed by: INTERNAL MEDICINE

## 2019-08-09 PROCEDURE — 84145 PROCALCITONIN (PCT): CPT | Performed by: INTERNAL MEDICINE

## 2019-08-09 PROCEDURE — 25010000002 FENTANYL CITRATE (PF) 100 MCG/2ML SOLUTION: Performed by: INTERNAL MEDICINE

## 2019-08-09 PROCEDURE — 99153 MOD SED SAME PHYS/QHP EA: CPT | Performed by: INTERNAL MEDICINE

## 2019-08-09 PROCEDURE — 71275 CT ANGIOGRAPHY CHEST: CPT

## 2019-08-09 PROCEDURE — 99233 SBSQ HOSP IP/OBS HIGH 50: CPT | Performed by: INTERNAL MEDICINE

## 2019-08-09 PROCEDURE — C1769 GUIDE WIRE: HCPCS

## 2019-08-09 PROCEDURE — 25010000003 LIDOCAINE 1 % SOLUTION: Performed by: INTERNAL MEDICINE

## 2019-08-09 PROCEDURE — C1757 CATH, THROMBECTOMY/EMBOLECT: HCPCS | Performed by: INTERNAL MEDICINE

## 2019-08-09 PROCEDURE — 02CQ3ZZ EXTIRPATION OF MATTER FROM RIGHT PULMONARY ARTERY, PERCUTANEOUS APPROACH: ICD-10-PCS | Performed by: INTERNAL MEDICINE

## 2019-08-09 PROCEDURE — 80048 BASIC METABOLIC PNL TOTAL CA: CPT | Performed by: INTERNAL MEDICINE

## 2019-08-09 PROCEDURE — 02CR3ZZ EXTIRPATION OF MATTER FROM LEFT PULMONARY ARTERY, PERCUTANEOUS APPROACH: ICD-10-PCS | Performed by: INTERNAL MEDICINE

## 2019-08-09 PROCEDURE — 85025 COMPLETE CBC W/AUTO DIFF WBC: CPT | Performed by: INTERNAL MEDICINE

## 2019-08-09 PROCEDURE — 25010000002 MIDAZOLAM PER 1 MG: Performed by: INTERNAL MEDICINE

## 2019-08-09 PROCEDURE — 25010000002 HEPARIN (PORCINE) PER 1000 UNITS: Performed by: INTERNAL MEDICINE

## 2019-08-09 PROCEDURE — 94799 UNLISTED PULMONARY SVC/PX: CPT

## 2019-08-09 PROCEDURE — 83605 ASSAY OF LACTIC ACID: CPT | Performed by: INTERNAL MEDICINE

## 2019-08-09 PROCEDURE — 25010000002 LEVOFLOXACIN PER 250 MG: Performed by: INTERNAL MEDICINE

## 2019-08-09 PROCEDURE — C1769 GUIDE WIRE: HCPCS | Performed by: INTERNAL MEDICINE

## 2019-08-09 PROCEDURE — 25010000002 ENOXAPARIN PER 10 MG: Performed by: INTERNAL MEDICINE

## 2019-08-09 PROCEDURE — 85651 RBC SED RATE NONAUTOMATED: CPT | Performed by: INTERNAL MEDICINE

## 2019-08-09 PROCEDURE — 4A023N6 MEASUREMENT OF CARDIAC SAMPLING AND PRESSURE, RIGHT HEART, PERCUTANEOUS APPROACH: ICD-10-PCS | Performed by: INTERNAL MEDICINE

## 2019-08-09 PROCEDURE — 85007 BL SMEAR W/DIFF WBC COUNT: CPT | Performed by: INTERNAL MEDICINE

## 2019-08-09 PROCEDURE — 80061 LIPID PANEL: CPT | Performed by: INTERNAL MEDICINE

## 2019-08-09 RX ORDER — MIDAZOLAM HYDROCHLORIDE 1 MG/ML
INJECTION INTRAMUSCULAR; INTRAVENOUS AS NEEDED
Status: DISCONTINUED | OUTPATIENT
Start: 2019-08-09 | End: 2019-08-09 | Stop reason: HOSPADM

## 2019-08-09 RX ORDER — FENTANYL CITRATE 50 UG/ML
INJECTION, SOLUTION INTRAMUSCULAR; INTRAVENOUS AS NEEDED
Status: DISCONTINUED | OUTPATIENT
Start: 2019-08-09 | End: 2019-08-09 | Stop reason: HOSPADM

## 2019-08-09 RX ORDER — LIDOCAINE HYDROCHLORIDE 10 MG/ML
INJECTION, SOLUTION INFILTRATION; PERINEURAL AS NEEDED
Status: DISCONTINUED | OUTPATIENT
Start: 2019-08-09 | End: 2019-08-09 | Stop reason: HOSPADM

## 2019-08-09 RX ORDER — SODIUM CHLORIDE FOR INHALATION 3 %
4 VIAL, NEBULIZER (ML) INHALATION ONCE
Status: COMPLETED | OUTPATIENT
Start: 2019-08-09 | End: 2019-08-09

## 2019-08-09 RX ORDER — LORAZEPAM 0.5 MG/1
0.5 TABLET ORAL EVERY 6 HOURS PRN
Status: DISCONTINUED | OUTPATIENT
Start: 2019-08-09 | End: 2019-08-10 | Stop reason: HOSPADM

## 2019-08-09 RX ORDER — CYCLOBENZAPRINE HCL 10 MG
10 TABLET ORAL DAILY
COMMUNITY
Start: 2019-08-02

## 2019-08-09 RX ORDER — LEVOFLOXACIN 5 MG/ML
500 INJECTION, SOLUTION INTRAVENOUS EVERY 24 HOURS
Status: DISCONTINUED | OUTPATIENT
Start: 2019-08-09 | End: 2019-08-10 | Stop reason: HOSPADM

## 2019-08-09 RX ORDER — HEPARIN SODIUM 1000 [USP'U]/ML
INJECTION, SOLUTION INTRAVENOUS; SUBCUTANEOUS AS NEEDED
Status: DISCONTINUED | OUTPATIENT
Start: 2019-08-09 | End: 2019-08-09 | Stop reason: HOSPADM

## 2019-08-09 RX ADMIN — CARVEDILOL 25 MG: 25 TABLET, FILM COATED ORAL at 08:57

## 2019-08-09 RX ADMIN — IPRATROPIUM BROMIDE AND ALBUTEROL SULFATE 3 ML: .5; 3 SOLUTION RESPIRATORY (INHALATION) at 07:02

## 2019-08-09 RX ADMIN — CYANOCOBALAMIN TAB 500 MCG 500 MCG: 500 TAB at 08:57

## 2019-08-09 RX ADMIN — LEVOFLOXACIN 500 MG: 5 INJECTION, SOLUTION INTRAVENOUS at 17:34

## 2019-08-09 RX ADMIN — VITAMIN D, TAB 1000IU (100/BT) 1000 UNITS: 25 TAB at 08:57

## 2019-08-09 RX ADMIN — Medication 1 CAPSULE: at 15:33

## 2019-08-09 RX ADMIN — SODIUM CHLORIDE SOLN NEBU 3% 4 ML: 3 NEBU SOLN at 07:02

## 2019-08-09 RX ADMIN — IOPAMIDOL 100 ML: 612 INJECTION, SOLUTION INTRAVENOUS at 10:30

## 2019-08-09 RX ADMIN — Medication 1 CAPSULE: at 08:57

## 2019-08-09 RX ADMIN — LORAZEPAM 0.5 MG: 0.5 TABLET ORAL at 10:37

## 2019-08-09 RX ADMIN — APIXABAN 10 MG: 5 TABLET, FILM COATED ORAL at 15:33

## 2019-08-09 RX ADMIN — IPRATROPIUM BROMIDE AND ALBUTEROL SULFATE 3 ML: .5; 3 SOLUTION RESPIRATORY (INHALATION) at 00:15

## 2019-08-09 RX ADMIN — ENOXAPARIN SODIUM 110 MG: 120 INJECTION SUBCUTANEOUS at 02:53

## 2019-08-09 RX ADMIN — ASPIRIN 81 MG: 81 TABLET, COATED ORAL at 08:57

## 2019-08-09 RX ADMIN — Medication 1 CAPSULE: at 20:25

## 2019-08-09 RX ADMIN — OXYCODONE HYDROCHLORIDE AND ACETAMINOPHEN 500 MG: 500 TABLET ORAL at 08:57

## 2019-08-09 RX ADMIN — CARVEDILOL 25 MG: 25 TABLET, FILM COATED ORAL at 17:33

## 2019-08-09 RX ADMIN — IPRATROPIUM BROMIDE AND ALBUTEROL SULFATE 3 ML: .5; 3 SOLUTION RESPIRATORY (INHALATION) at 19:03

## 2019-08-09 RX ADMIN — APIXABAN 10 MG: 5 TABLET, FILM COATED ORAL at 23:20

## 2019-08-09 RX ADMIN — LORAZEPAM 0.5 MG: 0.5 TABLET ORAL at 03:16

## 2019-08-09 RX ADMIN — SODIUM CHLORIDE, PRESERVATIVE FREE 3 ML: 5 INJECTION INTRAVENOUS at 21:52

## 2019-08-09 RX ADMIN — ACETAMINOPHEN 650 MG: 325 TABLET ORAL at 15:34

## 2019-08-09 RX ADMIN — SODIUM CHLORIDE 100 ML/HR: 9 INJECTION, SOLUTION INTRAVENOUS at 21:00

## 2019-08-10 ENCOUNTER — APPOINTMENT (OUTPATIENT)
Dept: ULTRASOUND IMAGING | Facility: HOSPITAL | Age: 64
End: 2019-08-10

## 2019-08-10 VITALS
DIASTOLIC BLOOD PRESSURE: 73 MMHG | WEIGHT: 260.1 LBS | TEMPERATURE: 97.7 F | SYSTOLIC BLOOD PRESSURE: 114 MMHG | OXYGEN SATURATION: 96 % | BODY MASS INDEX: 37.24 KG/M2 | HEART RATE: 91 BPM | RESPIRATION RATE: 18 BRPM | HEIGHT: 70 IN

## 2019-08-10 PROBLEM — I47.1 PAROXYSMAL SVT (SUPRAVENTRICULAR TACHYCARDIA) (HCC): Status: ACTIVE | Noted: 2019-08-10

## 2019-08-10 PROBLEM — I26.02 ACUTE SADDLE PULMONARY EMBOLISM WITH ACUTE COR PULMONALE (HCC): Status: ACTIVE | Noted: 2019-08-10

## 2019-08-10 PROBLEM — I47.10 PAROXYSMAL SVT (SUPRAVENTRICULAR TACHYCARDIA): Status: ACTIVE | Noted: 2019-08-10

## 2019-08-10 PROBLEM — I21.4 NSTEMI (NON-ST ELEVATED MYOCARDIAL INFARCTION) (HCC): Status: RESOLVED | Noted: 2019-08-08 | Resolved: 2019-08-10

## 2019-08-10 LAB
ANION GAP SERPL CALCULATED.3IONS-SCNC: 16.1 MMOL/L (ref 5–15)
BASOPHILS # BLD AUTO: 0.03 10*3/MM3 (ref 0–0.2)
BASOPHILS NFR BLD AUTO: 0.4 % (ref 0–1.5)
BUN BLD-MCNC: 17 MG/DL (ref 8–23)
BUN/CREAT SERPL: 15.6 (ref 7–25)
CALCIUM SPEC-SCNC: 8.4 MG/DL (ref 8.6–10.5)
CHLORIDE SERPL-SCNC: 107 MMOL/L (ref 98–107)
CO2 SERPL-SCNC: 19.6 MMOL/L (ref 22–29)
CREAT BLD-MCNC: 1.09 MG/DL (ref 0.57–1)
DEPRECATED RDW RBC AUTO: 42.5 FL (ref 37–54)
EOSINOPHIL # BLD AUTO: 0.18 10*3/MM3 (ref 0–0.4)
EOSINOPHIL NFR BLD AUTO: 2.3 % (ref 0.3–6.2)
ERYTHROCYTE [DISTWIDTH] IN BLOOD BY AUTOMATED COUNT: 12.9 % (ref 12.3–15.4)
GFR SERPL CREATININE-BSD FRML MDRD: 51 ML/MIN/1.73
GLUCOSE BLD-MCNC: 135 MG/DL (ref 65–99)
HCT VFR BLD AUTO: 29.7 % (ref 34–46.6)
HGB BLD-MCNC: 9.4 G/DL (ref 12–15.9)
IMM GRANULOCYTES # BLD AUTO: 0.03 10*3/MM3 (ref 0–0.05)
IMM GRANULOCYTES NFR BLD AUTO: 0.4 % (ref 0–0.5)
LYMPHOCYTES # BLD AUTO: 1.92 10*3/MM3 (ref 0.7–3.1)
LYMPHOCYTES NFR BLD AUTO: 24.9 % (ref 19.6–45.3)
MCH RBC QN AUTO: 28.3 PG (ref 26.6–33)
MCHC RBC AUTO-ENTMCNC: 31.6 G/DL (ref 31.5–35.7)
MCV RBC AUTO: 89.5 FL (ref 79–97)
MONOCYTES # BLD AUTO: 0.77 10*3/MM3 (ref 0.1–0.9)
MONOCYTES NFR BLD AUTO: 10 % (ref 5–12)
NEUTROPHILS # BLD AUTO: 4.79 10*3/MM3 (ref 1.7–7)
NEUTROPHILS NFR BLD AUTO: 62 % (ref 42.7–76)
NRBC BLD AUTO-RTO: 0 /100 WBC (ref 0–0.2)
PLATELET # BLD AUTO: 251 10*3/MM3 (ref 140–450)
PMV BLD AUTO: 9.7 FL (ref 6–12)
POTASSIUM BLD-SCNC: 3.7 MMOL/L (ref 3.5–5.2)
RBC # BLD AUTO: 3.32 10*6/MM3 (ref 3.77–5.28)
SODIUM BLD-SCNC: 139 MMOL/L (ref 136–145)
WBC NRBC COR # BLD: 7.72 10*3/MM3 (ref 3.4–10.8)

## 2019-08-10 PROCEDURE — 80048 BASIC METABOLIC PNL TOTAL CA: CPT | Performed by: INTERNAL MEDICINE

## 2019-08-10 PROCEDURE — 99239 HOSP IP/OBS DSCHRG MGMT >30: CPT | Performed by: INTERNAL MEDICINE

## 2019-08-10 PROCEDURE — 85025 COMPLETE CBC W/AUTO DIFF WBC: CPT | Performed by: INTERNAL MEDICINE

## 2019-08-10 PROCEDURE — 93970 EXTREMITY STUDY: CPT

## 2019-08-10 RX ORDER — DILTIAZEM HYDROCHLORIDE 180 MG/1
180 CAPSULE, COATED, EXTENDED RELEASE ORAL
Status: DISCONTINUED | OUTPATIENT
Start: 2019-08-10 | End: 2019-08-10 | Stop reason: HOSPADM

## 2019-08-10 RX ORDER — LEVOFLOXACIN 500 MG/1
500 TABLET, FILM COATED ORAL DAILY
Qty: 2 TABLET | Refills: 0 | Status: SHIPPED | OUTPATIENT
Start: 2019-08-10 | End: 2021-04-14

## 2019-08-10 RX ORDER — DILTIAZEM HYDROCHLORIDE 180 MG/1
180 CAPSULE, COATED, EXTENDED RELEASE ORAL
Qty: 30 CAPSULE | Refills: 0 | Status: SHIPPED | OUTPATIENT
Start: 2019-08-11 | End: 2022-10-28 | Stop reason: SDUPTHER

## 2019-08-10 RX ORDER — ASPIRIN 81 MG/1
81 TABLET ORAL DAILY
Qty: 30 TABLET | Refills: 0 | Status: SHIPPED | OUTPATIENT
Start: 2019-08-11 | End: 2021-04-14

## 2019-08-10 RX ORDER — BENZONATATE 100 MG/1
100 CAPSULE ORAL 3 TIMES DAILY PRN
Qty: 10 CAPSULE | Refills: 0 | Status: SHIPPED | OUTPATIENT
Start: 2019-08-10 | End: 2021-04-14

## 2019-08-10 RX ORDER — DOCUSATE SODIUM 100 MG/1
100 CAPSULE, LIQUID FILLED ORAL 2 TIMES DAILY
Status: DISCONTINUED | OUTPATIENT
Start: 2019-08-10 | End: 2019-08-10 | Stop reason: HOSPADM

## 2019-08-10 RX ADMIN — CARVEDILOL 25 MG: 25 TABLET, FILM COATED ORAL at 09:11

## 2019-08-10 RX ADMIN — SODIUM CHLORIDE, PRESERVATIVE FREE 3 ML: 5 INJECTION INTRAVENOUS at 09:11

## 2019-08-10 RX ADMIN — VITAMIN D, TAB 1000IU (100/BT) 1000 UNITS: 25 TAB at 09:11

## 2019-08-10 RX ADMIN — ASPIRIN 81 MG: 81 TABLET, COATED ORAL at 09:11

## 2019-08-10 RX ADMIN — OXYCODONE HYDROCHLORIDE AND ACETAMINOPHEN 500 MG: 500 TABLET ORAL at 09:11

## 2019-08-10 RX ADMIN — DILTIAZEM HYDROCHLORIDE 180 MG: 180 CAPSULE, COATED, EXTENDED RELEASE ORAL at 09:11

## 2019-08-10 RX ADMIN — Medication 1 CAPSULE: at 09:10

## 2019-08-10 RX ADMIN — CYANOCOBALAMIN TAB 500 MCG 500 MCG: 500 TAB at 09:10

## 2019-08-10 RX ADMIN — DOCUSATE SODIUM 100 MG: 100 CAPSULE, LIQUID FILLED ORAL at 09:10

## 2019-08-11 ENCOUNTER — READMISSION MANAGEMENT (OUTPATIENT)
Dept: CALL CENTER | Facility: HOSPITAL | Age: 64
End: 2019-08-11

## 2019-08-11 NOTE — OUTREACH NOTE
Prep Survey      Responses   Facility patient discharged from?  [facility]   Is patient eligible?  Yes   Discharge diagnosis  Acute saddle pulmonary embolism with acute cor pulmonale, Sepsis, Bilateral basilar pneumonia, present on admission   Does the patient have one of the following disease processes/diagnoses(primary or secondary)?  Sepsis   Does the patient have Home health ordered?  No   Is there a DME ordered?  No   Medication alerts for this patient  ASA, Eliquis, Cardizem    General alerts for this patient  Heart Cath    Prep survey completed?  Yes          Ebony Villa RN

## 2019-08-12 ENCOUNTER — READMISSION MANAGEMENT (OUTPATIENT)
Dept: CALL CENTER | Facility: HOSPITAL | Age: 64
End: 2019-08-12

## 2019-08-12 NOTE — OUTREACH NOTE
Sepsis Week 1 Survey      Responses   Facility patient discharged from?  [facility]   Does the patient have one of the following disease processes/diagnoses(primary or secondary)?  Sepsis   Is there a successful TCM telephone encounter documented?  No   Week 1 attempt successful?  Yes   Call start time  1715   Call end time  1725   Discharge diagnosis  Acute saddle pulmonary embolism with acute cor pulmonale, Sepsis, Bilateral basilar pneumonia, present on admission   Meds reviewed with patient/caregiver?  Yes   Is the patient having any side effects they believe may be caused by any medication additions or changes?  No   Does the patient have all medications related to this admission filled (includes all antibiotics, inhalers, nebulizers,steroids,etc.)  Yes   Is the patient taking all medications as directed (includes completed medication regime)?  Yes   Does the patient have a primary care provider?   Yes   Does the patient have an appointment with their PCP within 7 days of discharge?  Yes   Has the patient kept scheduled appointments due by today?  N/A   Has home health visited the patient within 72 hours of discharge?  N/A   Psychosocial issues?  No   Did the patient receive a copy of their discharge instructions?  Yes   Nursing interventions  Reviewed instructions with patient   What is the patient's perception of their health status since discharge?  Improving   Nursing interventions  Nurse provided patient education   Is the patient/caregiver able to teach back Sepsis?  S - Shivering,fever or very cold, E - Extreme pain or generalized discomfort (worst ever,especially abdomen), P - Pale or discolored skin, S - Sleepy, difficult to arouse,confused, I -   I feel like I might die-a feeling of hopelessness, S - Short of breath   Nursing interventions  Nurse provided reassurance to patient, Nurse provided patient education   Is patient/caregiver able to teach back steps to recovery at home?  Set small, achievable  "goals for return to baseline health, Rest and regain strength   Is the patient/caregiver able to teach back signs and symptoms of worsening condition:  Fever, Rapid heart rate (>90), Altered mental status(confusion/coma), Hyperthermia, Shortness of breath/rapid respiratory rate, Edema   Is the patient/caregiver able to teach back the hierarchy of who to call/visit for symptoms/problems? PCP, Specialist, Home health nurse, Urgent Care, ED, 911  Yes   Additional teach back comments  pt set up indoor walking \"trail\" to stay active and avoiding outdoor heat   Week 1 call completed?  Yes          Nelly Rice RN  "

## 2019-08-13 LAB
BACTERIA SPEC AEROBE CULT: NORMAL
BACTERIA SPEC AEROBE CULT: NORMAL

## 2019-08-19 ENCOUNTER — READMISSION MANAGEMENT (OUTPATIENT)
Dept: CALL CENTER | Facility: HOSPITAL | Age: 64
End: 2019-08-19

## 2019-08-19 NOTE — OUTREACH NOTE
Sepsis Week 2 Survey      Responses   Facility patient discharged from?  [facility]   Does the patient have one of the following disease processes/diagnoses(primary or secondary)?  Sepsis   Week 2 attempt successful?  No   Unsuccessful attempts  Attempt 1          Hillary Barrett RN

## 2019-08-20 ENCOUNTER — READMISSION MANAGEMENT (OUTPATIENT)
Dept: CALL CENTER | Facility: HOSPITAL | Age: 64
End: 2019-08-20

## 2019-08-20 NOTE — OUTREACH NOTE
Sepsis Week 2 Survey      Responses   Facility patient discharged from?  [facility]   Does the patient have one of the following disease processes/diagnoses(primary or secondary)?  Sepsis   Week 2 attempt successful?  Yes   Call start time  1144   Rescheduled  Revoked   Revoke  Decline to participate [Seeing her MD  frequently and doesnt feel call will be necessary. Aware of Nurse Call Center.]   Discharge diagnosis  Acute saddle pulmonary embolism with acute cor pulmonale, Sepsis, Bilateral basilar pneumonia, present on admission          Hillary Barrett RN

## 2019-09-09 ENCOUNTER — NURSE TRIAGE (OUTPATIENT)
Dept: CALL CENTER | Facility: HOSPITAL | Age: 64
End: 2019-09-09

## 2019-09-09 PROCEDURE — 99282 EMERGENCY DEPT VISIT SF MDM: CPT

## 2019-09-10 ENCOUNTER — APPOINTMENT (OUTPATIENT)
Dept: GENERAL RADIOLOGY | Facility: HOSPITAL | Age: 64
End: 2019-09-10

## 2019-09-10 ENCOUNTER — APPOINTMENT (OUTPATIENT)
Dept: ULTRASOUND IMAGING | Facility: HOSPITAL | Age: 64
End: 2019-09-10

## 2019-09-10 ENCOUNTER — HOSPITAL ENCOUNTER (EMERGENCY)
Facility: HOSPITAL | Age: 64
Discharge: HOME | End: 2019-09-10
Attending: EMERGENCY MEDICINE | Admitting: EMERGENCY MEDICINE

## 2019-09-10 VITALS
OXYGEN SATURATION: 98 % | TEMPERATURE: 98.8 F | HEIGHT: 70 IN | DIASTOLIC BLOOD PRESSURE: 72 MMHG | HEART RATE: 88 BPM | BODY MASS INDEX: 35.56 KG/M2 | SYSTOLIC BLOOD PRESSURE: 140 MMHG | WEIGHT: 248.4 LBS | RESPIRATION RATE: 18 BRPM

## 2019-09-10 DIAGNOSIS — I87.001 POST-THROMBOTIC SYNDROME OF RIGHT LOWER EXTREMITY: Primary | ICD-10-CM

## 2019-09-10 PROCEDURE — 73562 X-RAY EXAM OF KNEE 3: CPT

## 2019-09-10 PROCEDURE — 93971 EXTREMITY STUDY: CPT

## 2019-09-10 NOTE — DISCHARGE INSTRUCTIONS
What is post-thrombotic syndrome?  Post-thrombotic syndrome is a condition that can happen to people who have had a deep vein thrombosis (DVT) of the leg. The condition can cause chronic pain, swelling, and other symptoms in your leg. It may develop in the weeks or months following a DVT.  Veins are the blood vessels that bring oxygen-poor blood and waste products back to the heart. Arteries are the blood vessels that bring oxygen-rich blood and nutrients to the body. A DVT is a blood clot that forms in a vein deep inside the body. In most cases, this clot forms inside 1 of the deep veins of the thigh or lower leg.  The veins in your legs have tiny valves that help keep blood moving back up toward the heart. But a DVT may damage 1 or more of these valves. This causes them to weaken or become leaky. When this happens, blood starts to pool in your legs.  DVT is a common condition, especially in people over age 65. Post-thrombotic syndrome affects a large number of people who have had DVT. It can happen in men and women of any age.  What causes post-thrombotic syndrome?  A variety of conditions can increase your chance of getting a DVT, such as:  Recent surgery, which decreases your mobility and increases inflammation in the body, which can lead to clotting   Medical conditions that limit your mobility, such as an injury or stroke   Long periods of travel, which limit your mobility   Injury to a deep vein   Inherited blood disorders that increase clotting   Pregnancy   Cancer treatment  Who is at risk for post-thrombotic syndrome?  Certain factors may increase your risk for post-thrombotic syndrome, such as:  Being very overweight   Having a DVT that causes symptoms   Getting a thrombosis above the knee instead of below it   Having more than 1 DVT   Having increased pressure in the veins in your legs   Not taking blood thinners after your DVT  What are the symptoms of post-thrombotic syndrome?  In some cases,  post-thrombotic syndrome causes only a few mild symptoms. In other cases, it can cause severe symptoms. The symptoms occur in the same leg that had the DVT, and can include:  A feeling of heaviness in the leg   Itching, tingling, or cramping in your leg   Leg pain that’s worse with standing, better after resting or raising your leg   Widening of leg veins   Swelling in your leg   Darkening or redness of the skin around your leg  How is post-thrombotic syndrome diagnosed?  Your healthcare provider will ask about your medical history, including if you have had a DVT. He or she will ask about your symptoms and give you a physical exam. This will include a careful exam of your leg. You may also need some tests, such as:  Ultrasound. This is done to look for problems with the leg vein valves.   Blood test. This is done to check for clotting problems with your blood.  Healthcare providers often use something called a Goldberg score to assess post-thrombotic syndrome. This scale rates the severity of your symptoms and signs. A score higher than 15 means that you have severe post-thrombotic syndrome.  How is post-thrombotic syndrome treated?  Compression is the main treatment for post-thrombotic syndrome. This helps to increase the blood flow in your veins, and decrease your symptoms.  You may be given prescription-grade compression stockings. These apply more pressure than the type you can buy over-the-counter. These are worn during the day, on the leg that had the DVT. You also may also be given an intermittent pneumatic compression (IPC) device. This device applies pressure on the veins of your leg.  Proper skin care is also essential. You healthcare provider may advise that you use a product to lubricate your skin, such as petroleum jelly. Barrier creams that contain zinc oxide can also be helpful. In some cases, you may need a steroid cream or ointment to treat your skin. If you develop leg sores (ulcers), they may  need special treatment.  In some cases, your provider may advise surgery. This can be done to remove a blockage in a major vein. It can also be done to repair the valves in your leg veins.  Living with post-thrombotic syndrome  Symptoms often improve with treatment, but your symptoms may not all go away. It may help if you:  Ask your provider about exercise training   Walk every day to increase calf muscle strength and general health   Do daily ankle flexing exercises to strengthen calf muscles   Raise (elevate) your legs several times a day and whenever you are at rest   Pay careful attention to dry, itching skin and any skin changes. Ask your provider what types of skin moisturizers to use.   What are possible complications of post-thrombotic syndrome?  Post-thrombotic syndrome can cause leg sores (ulcers). If so, you will need to have wound care. Aspirin and a medicine called pentoxifylline may help aid ulcer healing. If an ulcer becomes infected, you may need antibiotics. Severe ulcers that don’t get better with medicines and wound care therapy may need surgery to remove the damaged tissue.  What can I do to prevent post-thrombotic syndrome?  You can reduce your risk of post-thrombotic syndrome by lowering your risk of DVT. Not moving or walking for long periods of time raises your risk of DVT. If you are immobile due to a medical condition or surgery, your healthcare provider will advise you how to prevent DVT. This may include:  Taking blood-thinning medicine, such as warfarin   Using prescription-grade compression stockings   Using a compression device   Moving and walking as soon as you are able  Treating DVT right away is the best way to prevent post-thrombotic syndrome. Take blood-thinner medicine exactly as prescribed. Don’t miss any follow-up tests to check your blood levels of the medicine. Use your compression devices exactly as prescribed.  When should I call my healthcare provider?  Call your  healthcare provider right away if you have:  An ulcer or a warm, painful area on your leg   Symptoms of infection of an ulcer on your leg (heat, redness, warmth, fluid leakage, or a fever)   Symptoms of DVT, such as leg swelling, pain, or warmth  Key points about post-thrombotic syndrome  Post-thrombotic syndrome is a condition that can happen to people who have had a deep vein thrombosis (DVT) of the leg.   It can cause chronic pain, swelling, and other symptoms in your leg. It may develop in the weeks or months following a DVT.   Certain medical conditions increase your chance of getting a DVT.   Compression therapy is the main treatment for post-thrombotic syndrome. You might also need medicines, topical creams, or surgery.   Post-thrombotic syndrome may cause skin ulcers that need special therapy.  Next steps  Tips to help you get the most from a visit to your healthcare provider:  Know the reason for your visit and what you want to happen.   Before your visit, write down questions you want answered.   Bring someone with you to help you ask questions and remember what your provider tells you.   At the visit, write down the name of a new diagnosis, and any new medicines, treatments, or tests. Also write down any new instructions your provider gives you.   Know why a new medicine or treatment is prescribed, and how it will help you. Also know what the side effects are.   Ask if your condition can be treated in other ways.   Know why a test or procedure is recommended and what the results could mean.   Know what to expect if you do not take the medicine or have the test or procedure.   If you have a follow-up appointment, write down the date, time, and purpose for that visit.   Know how you can contact your provider if you have questions

## 2019-09-10 NOTE — TELEPHONE ENCOUNTER
"Frank states pain behind knee for over a month and today she heard a \"pop sound\". She states history of blood clot and has been in hospital recently. She is currently on Eliquis she states. She state she seen a MD recently and he told her if continues to have it seen about.     Reason for Disposition  • Patient sounds very sick or weak to the triager    Additional Information  • Negative: Sounds like a life-threatening emergency to the triager  • Negative: Followed a knee injury  • Negative: Leg pain is main symptom  • Negative: Knee swelling is main symptom  • Negative: [1] Swollen joint AND [2] fever  • Negative: [1] Red area or streak AND [2] fever  • Negative: [1] SEVERE pain (e.g., excruciating, unable to walk) AND [2] not improved after 2 hours of pain medicine  • Negative: [1] Can't move swollen joint at all AND [2] no fever    Answer Assessment - Initial Assessment Questions  1. LOCATION and RADIATION: \"Where is the pain located?\"      Behind right knee and left side   2. QUALITY: \"What does the pain feel like?\"  (e.g., sharp, dull, aching, burning)      Dull and sore   3. SEVERITY: \"How bad is the pain?\" \"What does it keep you from doing?\"   (Scale 1-10; or mild, moderate, severe)    -  MILD (1-3): doesn't interfere with normal activities     -  MODERATE (4-7): interferes with normal activities (e.g., work or school) or awakens from sleep, limping     -  SEVERE (8-10): excruciating pain, unable to do any normal activities, unable to walk      3  4. ONSET: \"When did the pain start?\" \"Does it come and go, or is it there all the time?\"      Since ultrasound on Aug 3, 2019  5. RECURRENT: \"Have you had this pain before?\" If so, ask: \"When, and what happened then?\"       Denies   6. SETTING: \"Has there been any recent work, exercise or other activity that involved that part of the body?\"       Since ultrasound   7. AGGRAVATING FACTORS: \"What makes the knee pain worse?\" (e.g., walking, climbing stairs, running)   " "   No staying the same  8. ASSOCIATED SYMPTOMS: \"Is there any swelling or redness of the knee?\"       Denies   9. OTHER SYMPTOMS: \"Do you have any other symptoms?\" (e.g., chest pain, difficulty breathing, fever, calf pain)      Denies   10. PREGNANCY: \"Is there any chance you are pregnant?\" \"When was your last menstrual period?\"        N/a    Protocols used: KNEE PAIN-ADULT-AH      "

## 2019-09-12 NOTE — ED PROVIDER NOTES
Subjective   History of Present Illness    Chief Complaint: Right popliteal pain and swelling  History of Present Illness: Patient has a history of a DVT and PE, status post vascular intervention 1 month ago.  States that she is not having any shortness of breath or hemoptysis.  Is currently on Eliquis.  Over the last few days she noticed right popliteal pain that is sometimes swollen.  She is concerned that she has new clots or worsening clots even on anticoagulation  Onset: 2 days  Duration: Persistent  Exacerbating / Alleviating factors: Ambulation and palpating area  Associated symptoms: None      Nurses Notes reviewed and agree, including vitals, allergies, social history and prior medical history.     REVIEW OF SYSTEMS: All systems reviewed and not pertinent unless noted.    Positive for: Right popliteal pain and swelling with a recent history of DVT and PE    Negative for: Chest pain, shortness of breath, hemoptysis, syncope, fall or injury  Review of Systems    Past Medical History:   Diagnosis Date   • Hypertension 05/22/2018   • Kidney stone 05/22/2018   • Renal disorder 05/22/2018       Allergies   Allergen Reactions   • Sulfa Antibiotics Nausea Only   • Ceclor [Cefaclor] Rash       Past Surgical History:   Procedure Laterality Date   • CARDIAC CATHETERIZATION N/A 8/9/2019    Procedure: Right Heart Cath;  Surgeon: Ousmane Napier MD;  Location: [facility] CATH INVASIVE LOCATION;  Service: Cardiovascular   • CARDIAC CATHETERIZATION  8/9/2019    Procedure: Percutaneous Manual Thrombectomy;  Surgeon: Ousmane Napier MD;  Location: [facility] CATH INVASIVE LOCATION;  Service: Cardiovascular   • KIDNEY STONE SURGERY     • NECK SURGERY     • SKIN BIOPSY         History reviewed. No pertinent family history.    Social History     Socioeconomic History   • Marital status:      Spouse name: Not on file   • Number of children: Not on file   • Years of education: Not on file   • Highest education  level: Not on file   Tobacco Use   • Smoking status: Never Smoker   • Smokeless tobacco: Never Used   Substance and Sexual Activity   • Alcohol use: No     Frequency: Never   • Drug use: Yes     Types: Marijuana   • Sexual activity: Defer           Objective   Physical Exam      GENERAL APPEARANCE: Well developed, well nourished, in no acute distress.  VITAL SIGNS: per nursing, reviewed and noted  SKIN: no rashes, ulcerations or petechiae.  Head: Normocephalic, atraumatic.   EYES: perrla. EOMI.  ENT:  TM clear, posterior pharynx patent.  LUNGS:  normal breath sounds. No retractions.   CARDIOVASCULAR:  regular rate and rhythm, no murmurs.  Good Peripheral pulses.  ABDOMEN: Soft, nontender, normal bowel sounds. No hernia. No ascites.  MUSCULOSKELETAL: Mild right popliteal and diffuse tenderness palpation without soft tissue swelling.  No compartment syndrome.  Good distal pulses and cap refill.  Negative Homans sign.  Full ROM. Strength and tone normal.  NEUROLOGIC: Alert, oriented x 3. No gross deficits.   NECK: Supple, symmetric. No tenderness, no masses. Full ROM  Back: full rom, no paraspinal spasm. No CVA tenderness.   : no bladder tenderness or distention, no CVA tenderness      Procedures     No attending provider procedures were performed      ED Course        Plain films interpreted by me reveals no fractures no dislocations normal joint spaces.  Ultrasound interpreted by radiologist reveals no evidence of DVT.          MDM  Symptoms most likely related to post thrombotic syndrome.  Patient otherwise nontoxic, declined any prescription pain medications discharged home stable condition with outpatient return precautions.  Final diagnoses:   Post-thrombotic syndrome of right lower extremity              Maximo Miramontes, DO  09/12/19 1053

## 2020-05-19 ENCOUNTER — HOSPITAL ENCOUNTER (EMERGENCY)
Facility: HOSPITAL | Age: 65
Discharge: HOME | End: 2020-05-19
Attending: EMERGENCY MEDICINE | Admitting: EMERGENCY MEDICINE

## 2020-05-19 VITALS
RESPIRATION RATE: 18 BRPM | SYSTOLIC BLOOD PRESSURE: 168 MMHG | OXYGEN SATURATION: 99 % | HEART RATE: 80 BPM | BODY MASS INDEX: 37.37 KG/M2 | TEMPERATURE: 98.9 F | HEIGHT: 70 IN | DIASTOLIC BLOOD PRESSURE: 90 MMHG | WEIGHT: 261 LBS

## 2020-05-19 DIAGNOSIS — Z51.89 VISIT FOR WOUND CHECK: Primary | ICD-10-CM

## 2020-05-19 PROCEDURE — 99282 EMERGENCY DEPT VISIT SF MDM: CPT
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
## 2020-10-07 ENCOUNTER — HOSPITAL ENCOUNTER (OUTPATIENT)
Dept: GENERAL RADIOLOGY | Facility: HOSPITAL | Age: 65
Discharge: HOME | End: 2020-10-07
Admitting: NURSE PRACTITIONER

## 2020-10-07 ENCOUNTER — TRANSCRIBE ORDERS (OUTPATIENT)
Dept: ADMINISTRATIVE | Facility: HOSPITAL | Age: 65
End: 2020-10-07

## 2020-10-07 DIAGNOSIS — M79.672 LEFT FOOT PAIN: Primary | ICD-10-CM

## 2020-10-07 DIAGNOSIS — M79.672 LEFT FOOT PAIN: ICD-10-CM

## 2020-10-07 PROCEDURE — 73630 X-RAY EXAM OF FOOT: CPT

## 2021-04-14 ENCOUNTER — CONSULT (OUTPATIENT)
Dept: CARDIOLOGY | Facility: CLINIC | Age: 66
End: 2021-04-14

## 2021-04-14 VITALS
HEIGHT: 71 IN | OXYGEN SATURATION: 99 % | DIASTOLIC BLOOD PRESSURE: 88 MMHG | HEART RATE: 69 BPM | WEIGHT: 258.8 LBS | BODY MASS INDEX: 36.23 KG/M2 | SYSTOLIC BLOOD PRESSURE: 178 MMHG

## 2021-04-14 DIAGNOSIS — I31.39 PERICARDIAL EFFUSION: ICD-10-CM

## 2021-04-14 DIAGNOSIS — I10 ESSENTIAL HYPERTENSION: ICD-10-CM

## 2021-04-14 DIAGNOSIS — I44.7 LBBB (LEFT BUNDLE BRANCH BLOCK): ICD-10-CM

## 2021-04-14 DIAGNOSIS — E78.5 HYPERLIPIDEMIA, UNSPECIFIED HYPERLIPIDEMIA TYPE: ICD-10-CM

## 2021-04-14 DIAGNOSIS — I26.02 ACUTE SADDLE PULMONARY EMBOLISM WITH ACUTE COR PULMONALE (HCC): Primary | ICD-10-CM

## 2021-04-14 PROBLEM — J18.9 PNEUMONIA OF RIGHT MIDDLE LOBE DUE TO INFECTIOUS ORGANISM: Status: RESOLVED | Noted: 2019-08-08 | Resolved: 2021-04-14

## 2021-04-14 PROBLEM — I47.1 PAROXYSMAL SVT (SUPRAVENTRICULAR TACHYCARDIA): Status: RESOLVED | Noted: 2019-08-10 | Resolved: 2021-04-14

## 2021-04-14 PROBLEM — N17.9 ACUTE KIDNEY INJURY: Status: RESOLVED | Noted: 2019-08-08 | Resolved: 2021-04-14

## 2021-04-14 PROBLEM — I47.10 PAROXYSMAL SVT (SUPRAVENTRICULAR TACHYCARDIA): Status: RESOLVED | Noted: 2019-08-10 | Resolved: 2021-04-14

## 2021-04-14 PROCEDURE — 99204 OFFICE O/P NEW MOD 45 MIN: CPT | Performed by: INTERNAL MEDICINE

## 2021-04-14 PROCEDURE — 93000 ELECTROCARDIOGRAM COMPLETE: CPT | Performed by: INTERNAL MEDICINE

## 2021-04-14 RX ORDER — ACETAMINOPHEN 500 MG
500 TABLET ORAL EVERY 6 HOURS PRN
COMMUNITY

## 2021-04-14 RX ORDER — CARVEDILOL 25 MG/1
25 TABLET ORAL 2 TIMES DAILY WITH MEALS
Start: 2021-04-14 | End: 2021-07-15

## 2021-04-14 RX ORDER — DOXAZOSIN 2 MG/1
2 TABLET ORAL
Start: 2021-04-14 | End: 2021-04-15

## 2021-04-14 RX ORDER — DOXAZOSIN 2 MG/1
2 TABLET ORAL
COMMUNITY
Start: 2021-03-08 | End: 2021-04-14 | Stop reason: SDUPTHER

## 2021-04-14 RX ORDER — LISINOPRIL 10 MG/1
10 TABLET ORAL 2 TIMES DAILY
Start: 2021-04-14 | End: 2021-04-15

## 2021-04-14 NOTE — ASSESSMENT & PLAN NOTE
· Improved dyspnea  · Due to history of large pulmonary embolism, I recommend lifelong anticoagulation with NOAC therapy.  Aspirin may be discontinued.  · Obtain echocardiogram to reassess RV function as well as pericardial effusion

## 2021-04-14 NOTE — ASSESSMENT & PLAN NOTE
· Uncontrolled today but patient reports nervousness with initial visit  · Obtain lab work to ensure normal potassium/creatinine  · If normal, recommend switching lisinopril to lisinopril/HCTZ 20/12.5 mg twice daily and discontinuing doxazosin

## 2021-04-14 NOTE — PROGRESS NOTES
"[facility] at [facility]  Cardiology Consultation Note     Jessica Murillo  1955  Requesting Provider: CHUCHO Dickens  PCP: Makenzie Ledezma APRN    ID:  Jessica Murillo is a 65 y.o. female who resides in [place], [place].    REASON FOR CONSULTATION:    • History of massive pulmonary embolism  • Essential hypertension         Dear CHUCHO Romero:    Thank you for referring Jessica Murillo to my office to establish care for her hypertension and history of pulmonary embolism.  The patient suffered a saddle pulmonary embolism in August 2019.  She was hospitalized at [facility] and underwent mechanical thrombectomy with Dr. Napier.  The patient was initially tearful and concerned about all of her \"health issues\".  She is concerned about her kidney failure and desires explanation of whether she had a heart attack in 2019 or not.    The patient reports that her breathing is good.  She is able to do activities that she wants to do without significant shortness of breath.  She does get swelling of her legs, particularly when she has been on them for a long time.        Past Medical History, Past Surgical History, Family history, Social History, and Medications were all reviewed with the patient today and updated as necessary.       Current Outpatient Medications:   •  acetaminophen (TYLENOL) 500 MG tablet, Take 500 mg by mouth Every 6 (Six) Hours As Needed for Mild Pain ., Disp: , Rfl:   •  apixaban (ELIQUIS) 5 MG tablet tablet, Take 1 tablet by mouth Every 12 (Twelve) Hours., Disp: 180 tablet, Rfl: 3  •  carvedilol (COREG) 25 MG tablet, Take 1 tablet by mouth 2 (Two) Times a Day With Meals., Disp: , Rfl:   •  cholecalciferol (VITAMIN D3) 1000 units tablet, Take 1,000 Units by mouth Daily., Disp: , Rfl:   •  cyclobenzaprine (FLEXERIL) 10 MG tablet, Take 10 mg by mouth Daily. Takes as needed. Last dose was wednesday, Disp: , Rfl:   •  diltiaZEM CD (CARDIZEM CD) " "180 MG 24 hr capsule, Take 1 capsule by mouth Daily., Disp: 30 capsule, Rfl: 0  •  doxazosin (CARDURA) 2 MG tablet, Take 1 tablet by mouth every night at bedtime., Disp: , Rfl:   •  lisinopril (PRINIVIL,ZESTRIL) 10 MG tablet, Take 1 tablet by mouth 2 (Two) Times a Day., Disp: , Rfl:   •  vitamin B-12 (CYANOCOBALAMIN) 500 MCG tablet, Take 500 mcg by mouth Daily., Disp: , Rfl:   •  vitamin C (ASCORBIC ACID) 500 MG tablet, Take 500 mg by mouth Daily., Disp: , Rfl:     Allergies   Allergen Reactions   • Sulfa Antibiotics Nausea Only   • Ceclor [Cefaclor] Rash         Past Medical History:   Diagnosis Date   • Acute kidney injury (CMS/HCC) 8/8/2019   • Arthritis    • Hypertension 05/22/2018   • Kidney stone 05/22/2018   • Renal disorder 05/22/2018       Past Surgical History:   Procedure Laterality Date   • CARDIAC CATHETERIZATION N/A 8/9/2019    Procedure: Right Heart Cath;  Surgeon: Ousmane Napier MD;  Location: [facility] CATH INVASIVE LOCATION;  Service: Cardiovascular   • CARDIAC CATHETERIZATION  8/9/2019    Procedure: Percutaneous Manual Thrombectomy;  Surgeon: Ousmane Napier MD;  Location: [facility] CATH INVASIVE LOCATION;  Service: Cardiovascular   • KIDNEY STONE SURGERY     • NECK SURGERY     • SKIN BIOPSY         Family History   Problem Relation Age of Onset   • Aneurysm Mother    • No Known Problems Father        Social History     Tobacco Use   • Smoking status: Never Smoker   • Smokeless tobacco: Never Used   Substance Use Topics   • Alcohol use: No       Review of Systems   Cardiovascular: Positive for irregular heartbeat.   Respiratory: Negative.                /88 (BP Location: Right arm, Patient Position: Sitting)   Pulse 69   Ht 180.3 cm (71\")   Wt 117 kg (258 lb 12.8 oz)   SpO2 99%   BMI 36.10 kg/m²        Constitutional:       Appearance: Healthy appearance. Well-developed.   Eyes:      General: Lids are normal. No scleral icterus.     Conjunctiva/sclera: Conjunctivae normal. "   HENT:      Head: Normocephalic and atraumatic.   Neck:      Thyroid: No thyromegaly.      Vascular: No carotid bruit or JVD.   Pulmonary:      Effort: Pulmonary effort is normal.      Breath sounds: Normal breath sounds. No wheezing. No rhonchi. No rales.   Cardiovascular:      Normal rate. Regular rhythm.      Murmurs: There is no murmur.      No gallop. No rub.   Pulses:     Intact distal pulses.   Edema:     Peripheral edema present.     Ankle: bilateral 2+ edema of the ankle.  Abdominal:      General: There is no distension.      Palpations: Abdomen is soft. There is no abdominal mass.   Musculoskeletal:      Cervical back: Normal range of motion. Skin:     General: Skin is warm and dry.      Findings: No rash.   Neurological:      General: No focal deficit present.      Mental Status: Alert and oriented to person, place, and time.      Gait: Gait is intact.   Psychiatric:         Attention and Perception: Attention normal.         Mood and Affect: Mood normal.         Behavior: Behavior normal.             ECG 12 Lead    Date/Time: 4/14/2021 11:37 AM  Performed by: Ankush España IV, MD  Authorized by: Ankush España IV, MD   Comparison: compared with previous ECG from 8/8/2019  Similar to previous ECG  Rhythm: sinus rhythm  BPM: 69  Conduction: incomplete left bundle branch block    Clinical impression: abnormal EKG            Lab Results   Component Value Date    CHOL 134 08/09/2019    TRIG 103 08/09/2019    HDL 23 (L) 08/09/2019    LDL 90 08/09/2019     Lab Results   Component Value Date    WBC 7.72 08/10/2019    HGB 9.4 (L) 08/10/2019    HCT 29.7 (L) 08/10/2019    MCV 89.5 08/10/2019     08/10/2019     Lab Results   Component Value Date    GLUCOSE 135 (H) 08/10/2019    BUN 17 08/10/2019    CREATININE 1.09 (H) 08/10/2019    EGFRIFNONA 51 (L) 08/10/2019    EGFRIFAFRI 61 05/22/2018    BCR 15.6 08/10/2019    K 3.7 08/10/2019    CO2 19.6 (L) 08/10/2019    CALCIUM 8.4 (L) 08/10/2019     ALBUMIN 3.50 08/08/2019    AST 17 08/08/2019    ALT 8 08/08/2019              Problem List Items Addressed This Visit        Cardiology Problems    Essential hypertension (Chronic)    Overview     • Target blood pressure <130/80 mmHg         Current Assessment & Plan     · Uncontrolled today but patient reports nervousness with initial visit  · Obtain lab work to ensure normal potassium/creatinine  · If normal, recommend switching lisinopril to lisinopril/HCTZ 20/12.5 mg twice daily and discontinuing doxazosin         Relevant Medications    carvedilol (COREG) 25 MG tablet    doxazosin (CARDURA) 2 MG tablet    lisinopril (PRINIVIL,ZESTRIL) 10 MG tablet    Other Relevant Orders    Comprehensive Metabolic Panel    History of massive pulmonary embolism with acute cor pulmonale - Primary    Overview     · Echo (8/8/2019): LVEF >55%. Moderate TR with RVSP 60 mmHg. Dilated RV with RV hypokinesis.  Pericardial effusion present.  · CT chest (8/9/2019): Saddle pulmonary embolus with right heart strain.  · Successful mechanical thrombectomy of large clot burden from the left and the right main pulmonary arteries, 8/10/2019.         Current Assessment & Plan     · Improved dyspnea  · Due to history of large pulmonary embolism, I recommend lifelong anticoagulation with NOAC therapy.  Aspirin may be discontinued.  · Obtain echocardiogram to reassess RV function as well as pericardial effusion         Relevant Medications    carvedilol (COREG) 25 MG tablet    Other Relevant Orders    CBC (No Diff)    Adult Transthoracic Echo Complete W/ Cont if Necessary Per Protocol    Hyperlipidemia    Current Assessment & Plan     · Obtain CMP and lipids         Relevant Orders    LDL Cholesterol, Direct    Lipid Panel    LBBB (left bundle branch block)    Overview     · EKG (4/14/2021): Sinus rhythm. Borderline left bundle-branch.         Relevant Medications    apixaban (ELIQUIS) 5 MG tablet tablet    carvedilol (COREG) 25 MG tablet       Other Visit Diagnoses     Pericardial effusion        Relevant Orders    Adult Transthoracic Echo Complete W/ Cont if Necessary Per Protocol                   · Discontinue aspirin  · Continue apixaban for history of saddle PE  · Echocardiogram to reassess RV function  · Obtain CMP, CBC, lipids today  · If renal function normal, change lisinopril to lisinopril/HCTZ 20/12.5 mg twice daily and discontinue doxazosin  Return in about 6 months (around 10/14/2021).          JULIO España MD, [facility]  Interventional Cardiology  04/14/21  16:53 EDT    Scribed for Ankush España IV, MD by Jaclyn Rosa.  04/14/21   12:53 EDT    I have personally performed the services described in this document as scribed by the above individual, and it is both accurate and complete.  Ankush España IV, MD  4/14/2021  16:50 EDT

## 2021-04-15 DIAGNOSIS — I10 ESSENTIAL HYPERTENSION: Primary | ICD-10-CM

## 2021-04-15 LAB
ALBUMIN SERPL-MCNC: 4.4 G/DL (ref 3.5–5.2)
ALBUMIN/GLOB SERPL: 1.7 G/DL
ALP SERPL-CCNC: 75 U/L (ref 39–117)
ALT SERPL-CCNC: 8 U/L (ref 1–33)
AST SERPL-CCNC: 17 U/L (ref 1–32)
BILIRUB SERPL-MCNC: 0.5 MG/DL (ref 0–1.2)
BUN SERPL-MCNC: 19 MG/DL (ref 8–23)
BUN/CREAT SERPL: 19.6 (ref 7–25)
CALCIUM SERPL-MCNC: 9.7 MG/DL (ref 8.6–10.5)
CHLORIDE SERPL-SCNC: 106 MMOL/L (ref 98–107)
CHOLEST SERPL-MCNC: 161 MG/DL (ref 0–200)
CO2 SERPL-SCNC: 26.9 MMOL/L (ref 22–29)
CREAT SERPL-MCNC: 0.97 MG/DL (ref 0.57–1)
ERYTHROCYTE [DISTWIDTH] IN BLOOD BY AUTOMATED COUNT: 13.2 % (ref 12.3–15.4)
GLOBULIN SER CALC-MCNC: 2.6 GM/DL
GLUCOSE SERPL-MCNC: 94 MG/DL (ref 65–99)
HCT VFR BLD AUTO: 41.2 % (ref 34–46.6)
HDLC SERPL-MCNC: 42 MG/DL (ref 40–60)
HGB BLD-MCNC: 13.8 G/DL (ref 12–15.9)
LDLC SERPL CALC-MCNC: 101 MG/DL (ref 0–100)
LDLC SERPL DIRECT ASSAY-MCNC: 119 MG/DL (ref 0–99)
MCH RBC QN AUTO: 30.4 PG (ref 26.6–33)
MCHC RBC AUTO-ENTMCNC: 33.5 G/DL (ref 31.5–35.7)
MCV RBC AUTO: 90.7 FL (ref 79–97)
PLATELET # BLD AUTO: 261 10*3/MM3 (ref 140–450)
POTASSIUM SERPL-SCNC: 4.4 MMOL/L (ref 3.5–5.2)
PROT SERPL-MCNC: 7 G/DL (ref 6–8.5)
RBC # BLD AUTO: 4.54 10*6/MM3 (ref 3.77–5.28)
SODIUM SERPL-SCNC: 140 MMOL/L (ref 136–145)
TRIGL SERPL-MCNC: 96 MG/DL (ref 0–150)
VLDLC SERPL CALC-MCNC: 18 MG/DL (ref 5–40)
WBC # BLD AUTO: 5.96 10*3/MM3 (ref 3.4–10.8)

## 2021-04-15 RX ORDER — LISINOPRIL AND HYDROCHLOROTHIAZIDE 20; 12.5 MG/1; MG/1
1 TABLET ORAL 2 TIMES DAILY
Qty: 180 TABLET | Refills: 0 | Status: SHIPPED | OUTPATIENT
Start: 2021-04-15 | End: 2021-04-15 | Stop reason: SDUPTHER

## 2021-04-15 RX ORDER — LISINOPRIL AND HYDROCHLOROTHIAZIDE 20; 12.5 MG/1; MG/1
1 TABLET ORAL 2 TIMES DAILY
Qty: 180 TABLET | Refills: 1 | Status: SHIPPED | OUTPATIENT
Start: 2021-04-15 | End: 2021-10-11

## 2021-04-15 NOTE — PROGRESS NOTES
As my note says, discontinue doxazosin and start lisinopril/HCTZ 20/12.5 mg twice daily.  Patient will need repeat BMP in 3 to 4 weeks

## 2021-04-15 NOTE — TELEPHONE ENCOUNTER
----- Message from Ankush España IV, MD sent at 4/15/2021  9:43 AM EDT -----  As my note says, discontinue doxazosin and start lisinopril/HCTZ 20/12.5 mg twice daily.  Patient will need repeat BMP in 3 to 4 weeks

## 2021-07-15 DIAGNOSIS — I10 ESSENTIAL HYPERTENSION: ICD-10-CM

## 2021-07-15 RX ORDER — CARVEDILOL 25 MG/1
TABLET ORAL
Qty: 180 TABLET | Refills: 3 | Status: SHIPPED | OUTPATIENT
Start: 2021-07-15 | End: 2022-10-28 | Stop reason: SDUPTHER

## 2021-10-11 RX ORDER — LISINOPRIL AND HYDROCHLOROTHIAZIDE 20; 12.5 MG/1; MG/1
TABLET ORAL
Qty: 180 TABLET | Refills: 0 | Status: SHIPPED | OUTPATIENT
Start: 2021-10-11 | End: 2022-01-31

## 2022-01-31 RX ORDER — LISINOPRIL AND HYDROCHLOROTHIAZIDE 20; 12.5 MG/1; MG/1
TABLET ORAL
Qty: 60 TABLET | Refills: 0 | Status: SHIPPED | OUTPATIENT
Start: 2022-01-31 | End: 2022-02-28

## 2022-02-12 ENCOUNTER — HOSPITAL ENCOUNTER (EMERGENCY)
Facility: HOSPITAL | Age: 67
Discharge: HOME | End: 2022-02-12
Attending: FAMILY MEDICINE | Admitting: FAMILY MEDICINE

## 2022-02-12 VITALS
HEART RATE: 86 BPM | SYSTOLIC BLOOD PRESSURE: 147 MMHG | WEIGHT: 244 LBS | HEIGHT: 71 IN | BODY MASS INDEX: 34.16 KG/M2 | OXYGEN SATURATION: 98 % | TEMPERATURE: 98.3 F | RESPIRATION RATE: 17 BRPM | DIASTOLIC BLOOD PRESSURE: 86 MMHG

## 2022-02-12 DIAGNOSIS — M79.605 PAIN IN BOTH LOWER EXTREMITIES: Primary | ICD-10-CM

## 2022-02-12 DIAGNOSIS — M79.604 PAIN IN BOTH LOWER EXTREMITIES: Primary | ICD-10-CM

## 2022-02-12 PROCEDURE — 99282 EMERGENCY DEPT VISIT SF MDM: CPT

## 2022-02-13 NOTE — ED PROVIDER NOTES
Subjective   History of Present Illness   Patient is a 66-year-old female with history of hypertension, kidney disease, and arthritis presenting to the ER with complaints of bilateral leg pain after going up and down stairs a lot on Thursday, 3 days ago. Patient states that she came to the ER because she was concerned she has a DVT. She states that she was told if she has pain or lumps in her legs she should come to the ER to rule out a blood clot. Patient is already on Eliquis 5 milligrams twice daily. She denies any shortness of breath, redness or warmth to her legs, and any additional symptoms or complaints at this time. Patient states she had an appointment with her PCP on Tuesday, 3 days from now but decided to come to the ER due to concern for a DVT.    Review of Systems   Musculoskeletal:        Bilateral lower extremity pain   All other systems reviewed and are negative.      Past Medical History:   Diagnosis Date   • Acute kidney injury (HCC) 8/8/2019   • Arthritis    • Hypertension 05/22/2018   • Kidney stone 05/22/2018   • Renal disorder 05/22/2018       Allergies   Allergen Reactions   • Sulfa Antibiotics Nausea Only   • Ceclor [Cefaclor] Rash       Past Surgical History:   Procedure Laterality Date   • CARDIAC CATHETERIZATION N/A 8/9/2019    Procedure: Right Heart Cath;  Surgeon: Ousmane Napier MD;  Location: [facility] CATH INVASIVE LOCATION;  Service: Cardiovascular   • CARDIAC CATHETERIZATION  8/9/2019    Procedure: Percutaneous Manual Thrombectomy;  Surgeon: Ousmane Napier MD;  Location:  [facility] CATH INVASIVE LOCATION;  Service: Cardiovascular   • KIDNEY STONE SURGERY     • NECK SURGERY     • SKIN BIOPSY         Family History   Problem Relation Age of Onset   • Aneurysm Mother    • No Known Problems Father        Social History     Socioeconomic History   • Marital status:    Tobacco Use   • Smoking status: Never Smoker   • Smokeless tobacco: Never Used   Substance and Sexual  Activity   • Alcohol use: No   • Drug use: Yes     Types: Marijuana   • Sexual activity: Defer           Objective   Physical Exam  Vitals and nursing note reviewed.   Constitutional:       General: She is not in acute distress.     Appearance: She is not toxic-appearing.   HENT:      Head: Normocephalic and atraumatic.      Right Ear: External ear normal.      Left Ear: External ear normal.      Nose: Nose normal.   Eyes:      Extraocular Movements: Extraocular movements intact.      Conjunctiva/sclera: Conjunctivae normal.   Cardiovascular:      Rate and Rhythm: Normal rate.      Heart sounds: Normal heart sounds.   Pulmonary:      Effort: Pulmonary effort is normal.      Breath sounds: Normal breath sounds.   Abdominal:      General: There is no distension.      Palpations: Abdomen is soft.   Musculoskeletal:         General: Normal range of motion.      Cervical back: Normal range of motion and neck supple.      Comments: No obvious swelling to either the patient's legs, no redness, no warmth, legs are neurovascularly intact   Skin:     General: Skin is warm and dry.   Neurological:      General: No focal deficit present.      Mental Status: She is alert and oriented to person, place, and time.   Psychiatric:         Mood and Affect: Mood normal.         Behavior: Behavior normal.         Procedures           ED Course                                                 MDM   Patient was evaluated in the ER for bilateral lower extremity pain due to concern for a DVT. Patient is hemodynamically stable, no acute distress, nontoxic-appearing on exam. Patient points to spots on her leg that she feels are abnormal. There are no obvious abnormalities appreciated. No redness, no obvious swelling, no pitting edema, no warmth or skin abnormalities. Patient is walking without difficulty. She is already on Eliquis 5 mg twice daily. I explained to the patient that the likelihood of a DVT is very low with her being on Eliquis  and no overlying skin changes. Patient has a follow-up appointment with her PCP on Tuesday, 3 days from now. She was advised to mention this to her PCP at this appointment. She was given precautions to return to the ER for new or worsening symptoms.    Final diagnoses:   Pain in both lower extremities       ED Disposition  ED Disposition     ED Disposition Condition Comment    Discharge Stable           Makenzie Ledezma, APRN  [address]  [address]  [facility] [address]  [phone]    Call   As needed     Emergency Department  [address] [facility] [address] [phone]  Go to   As needed, If symptoms worsen         Medication List      No changes were made to your prescriptions during this visit.          Cheryl Nathan, PAJesseeC  02/12/22 223

## 2022-02-13 NOTE — DISCHARGE INSTRUCTIONS
Follow-up with your PCP on Tuesday. Return to the ER for redness, severe swelling, warmth, and tenderness to the leg which could be signs of a DVT or shortness of breath/new or worsening symptoms.

## 2022-02-13 NOTE — ED NOTES
Pt received discharge instructions and verbalized understanding; Breathing even and non labored with no signs of distress; AOx4; GCS 15; Pt ambulated off unit with steady gait with son/ride.      Cassandra Ramirez RN  02/12/22 [id number]

## 2022-02-15 ENCOUNTER — TRANSCRIBE ORDERS (OUTPATIENT)
Dept: ADMINISTRATIVE | Facility: HOSPITAL | Age: 67
End: 2022-02-15

## 2022-02-15 ENCOUNTER — HOSPITAL ENCOUNTER (OUTPATIENT)
Dept: ULTRASOUND IMAGING | Facility: HOSPITAL | Age: 67
Discharge: HOME | End: 2022-02-15
Admitting: NURSE PRACTITIONER

## 2022-02-15 ENCOUNTER — APPOINTMENT (OUTPATIENT)
Dept: ULTRASOUND IMAGING | Facility: HOSPITAL | Age: 67
End: 2022-02-15

## 2022-02-15 DIAGNOSIS — Z12.31 ENCOUNTER FOR SCREENING MAMMOGRAM FOR MALIGNANT NEOPLASM OF BREAST: ICD-10-CM

## 2022-02-15 DIAGNOSIS — M79.604 PAIN OF RIGHT LOWER EXTREMITY: ICD-10-CM

## 2022-02-15 DIAGNOSIS — M79.604 PAIN OF RIGHT LOWER EXTREMITY: Primary | ICD-10-CM

## 2022-02-15 PROCEDURE — 93971 EXTREMITY STUDY: CPT

## 2022-02-28 RX ORDER — LISINOPRIL AND HYDROCHLOROTHIAZIDE 20; 12.5 MG/1; MG/1
TABLET ORAL
Qty: 60 TABLET | Refills: 0 | Status: SHIPPED | OUTPATIENT
Start: 2022-02-28 | End: 2022-03-25

## 2022-03-25 RX ORDER — LISINOPRIL AND HYDROCHLOROTHIAZIDE 20; 12.5 MG/1; MG/1
TABLET ORAL
Qty: 60 TABLET | Refills: 0 | Status: SHIPPED | OUTPATIENT
Start: 2022-03-25 | End: 2022-04-18

## 2022-03-28 RX ORDER — APIXABAN 5 MG/1
TABLET, FILM COATED ORAL
Qty: 180 TABLET | Refills: 3 | Status: SHIPPED | OUTPATIENT
Start: 2022-03-28 | End: 2022-10-28 | Stop reason: SDUPTHER

## 2022-04-05 ENCOUNTER — APPOINTMENT (OUTPATIENT)
Dept: MAMMOGRAPHY | Facility: HOSPITAL | Age: 67
End: 2022-04-05

## 2022-04-18 RX ORDER — LISINOPRIL AND HYDROCHLOROTHIAZIDE 20; 12.5 MG/1; MG/1
TABLET ORAL
Qty: 60 TABLET | Refills: 0 | Status: SHIPPED | OUTPATIENT
Start: 2022-04-18 | End: 2022-04-28

## 2022-04-28 RX ORDER — LISINOPRIL AND HYDROCHLOROTHIAZIDE 20; 12.5 MG/1; MG/1
TABLET ORAL
Qty: 60 TABLET | Refills: 0 | Status: SHIPPED | OUTPATIENT
Start: 2022-04-28 | End: 2022-06-13

## 2022-06-06 ENCOUNTER — HOSPITAL ENCOUNTER (EMERGENCY)
Facility: HOSPITAL | Age: 67
Discharge: HOME | End: 2022-06-07
Attending: EMERGENCY MEDICINE | Admitting: EMERGENCY MEDICINE

## 2022-06-06 VITALS
WEIGHT: 250 LBS | RESPIRATION RATE: 18 BRPM | SYSTOLIC BLOOD PRESSURE: 139 MMHG | OXYGEN SATURATION: 99 % | HEART RATE: 72 BPM | BODY MASS INDEX: 33.86 KG/M2 | TEMPERATURE: 98.5 F | DIASTOLIC BLOOD PRESSURE: 79 MMHG | HEIGHT: 72 IN

## 2022-06-06 DIAGNOSIS — T63.301A SPIDER BITE WOUND, ACCIDENTAL OR UNINTENTIONAL, INITIAL ENCOUNTER: Primary | ICD-10-CM

## 2022-06-06 PROCEDURE — 99283 EMERGENCY DEPT VISIT LOW MDM: CPT

## 2022-06-07 RX ORDER — DOXYCYCLINE 100 MG/1
100 CAPSULE ORAL ONCE
Status: COMPLETED | OUTPATIENT
Start: 2022-06-07 | End: 2022-06-07

## 2022-06-07 RX ORDER — DOXYCYCLINE 100 MG/1
100 CAPSULE ORAL 2 TIMES DAILY
Qty: 14 CAPSULE | Refills: 0 | Status: SHIPPED | OUTPATIENT
Start: 2022-06-07 | End: 2022-06-14

## 2022-06-07 RX ADMIN — DOXYCYCLINE 100 MG: 100 CAPSULE ORAL at 00:20

## 2022-06-07 NOTE — ED PROVIDER NOTES
"Subjective   Chief Complaint: Spider bite right antecubital fossa  History of Present Illness: 66-year-old female comes in for evaluation of above complaint.  1 hour prior to arrival she noticed pruritus to her right antecubital fossa.  She looked about her bed sheets and noted brown spider which she has since identified as a \"crab spider.\"  No systemic symptoms.  onset: 1 hour prior to arrival  Timing: Single inciting bite with ongoing pruritus  Exacerbating / Alleviating factors: None  Associated symptoms: None      Nurses Notes reviewed and agree, including vitals, allergies, social history and prior medical history.          Review of Systems   Constitutional: Negative.  Negative for fever.   HENT: Negative.    Eyes: Negative.    Respiratory: Negative.    Cardiovascular: Negative.    Gastrointestinal: Negative.    Genitourinary: Negative.    Musculoskeletal: Negative.    Skin:        Spider bite right antecubital fossa   Neurological: Negative.    Psychiatric/Behavioral: Negative.        Past Medical History:   Diagnosis Date   • Acute kidney injury (HCC) 8/8/2019   • Arthritis    • Hypertension 05/22/2018   • Kidney stone 05/22/2018   • Renal disorder 05/22/2018       Allergies   Allergen Reactions   • Sulfa Antibiotics Nausea Only   • Ceclor [Cefaclor] Rash       Past Surgical History:   Procedure Laterality Date   • CARDIAC CATHETERIZATION N/A 8/9/2019    Procedure: Right Heart Cath;  Surgeon: Ousmane Napier MD;  Location:  [facility] CATH INVASIVE LOCATION;  Service: Cardiovascular   • CARDIAC CATHETERIZATION  8/9/2019    Procedure: Percutaneous Manual Thrombectomy;  Surgeon: Ousmane Napier MD;  Location: [facility] CATH INVASIVE LOCATION;  Service: Cardiovascular   • KIDNEY STONE SURGERY     • NECK SURGERY     • SKIN BIOPSY         Family History   Problem Relation Age of Onset   • Aneurysm Mother    • No Known Problems Father        Social History     Socioeconomic History   • Marital status: "    Tobacco Use   • Smoking status: Never Smoker   • Smokeless tobacco: Never Used   Substance and Sexual Activity   • Alcohol use: No   • Drug use: Yes     Types: Marijuana   • Sexual activity: Defer           Objective   Physical Exam  Vitals and nursing note reviewed.   Constitutional:       Appearance: Normal appearance.   HENT:      Head: Normocephalic and atraumatic.      Nose: Nose normal.   Eyes:      Extraocular Movements: Extraocular movements intact.   Cardiovascular:      Rate and Rhythm: Normal rate and regular rhythm.      Pulses: Normal pulses.   Pulmonary:      Effort: Pulmonary effort is normal.   Abdominal:      General: Abdomen is flat.   Musculoskeletal:         General: Normal range of motion.      Cervical back: Normal range of motion.   Skin:     General: Skin is warm and dry.      Comments: Very small erythematous area to the right antecubital fossa.  No cellulitis or abscess.  No necrosis or ulceration.   Neurological:      General: No focal deficit present.      Mental Status: She is alert. Mental status is at baseline.   Psychiatric:         Mood and Affect: Mood normal.         Behavior: Behavior normal.         Procedures           ED Course                                                 MDM    Final diagnoses:   Spider bite wound, accidental or unintentional, initial encounter       ED Disposition  ED Disposition     ED Disposition   Discharge    Condition   Stable    Comment   --             Makenzie Ledezma, APRN  [address] [facility]  [address]  [facility] [id number] [phone]      As needed, For wound re-check    [facility] Emergency Department  [address] [facility] [address] [phone]    If symptoms worsen         Medication List      New Prescriptions    doxycycline 100 MG capsule  Commonly known as: MONODOX  Take 1 capsule by mouth 2 (Two) Times a Day for 7 days.           Where to Get Your Medications      These medications were  sent to [facility]/pharmacy #[id number] - [place], [place] - [address] [place] [address] - [phone]  - [phone] FX  [address] [facility] [address]    Phone: [phone]   · doxycycline 100 MG capsule          Thierno Edwards PA-C  06/07/22 0016

## 2022-06-13 RX ORDER — LISINOPRIL AND HYDROCHLOROTHIAZIDE 20; 12.5 MG/1; MG/1
TABLET ORAL
Qty: 60 TABLET | Refills: 0 | Status: SHIPPED | OUTPATIENT
Start: 2022-06-13 | End: 2022-09-09

## 2022-07-12 RX ORDER — LISINOPRIL AND HYDROCHLOROTHIAZIDE 20; 12.5 MG/1; MG/1
TABLET ORAL
Qty: 60 TABLET | Refills: 0 | OUTPATIENT
Start: 2022-07-12

## 2022-07-22 ENCOUNTER — TELEPHONE (OUTPATIENT)
Dept: CARDIOLOGY | Facility: CLINIC | Age: 67
End: 2022-07-22

## 2022-07-22 NOTE — TELEPHONE ENCOUNTER
Pt LVM for refill son carvedilol 25 mg BID. Pt has not seen [facility] since 4/2021 as a consult. Recent refills given on 4/28/22 with a msg sent to scheduled for overdue appt. No appt made. Returned pt call, no answer. LVM stating she needs an appt for further refills. Advised if she can schedule an appt we can give her refills to get her till the appt. Will await pt return call.

## 2022-09-09 RX ORDER — LISINOPRIL AND HYDROCHLOROTHIAZIDE 20; 12.5 MG/1; MG/1
TABLET ORAL
Qty: 60 TABLET | Refills: 0 | Status: SHIPPED | OUTPATIENT
Start: 2022-09-09 | End: 2022-10-10

## 2022-10-10 RX ORDER — LISINOPRIL AND HYDROCHLOROTHIAZIDE 20; 12.5 MG/1; MG/1
TABLET ORAL
Qty: 60 TABLET | Refills: 0 | Status: SHIPPED | OUTPATIENT
Start: 2022-10-10 | End: 2022-10-28 | Stop reason: SDUPTHER

## 2022-10-24 RX ORDER — LISINOPRIL AND HYDROCHLOROTHIAZIDE 20; 12.5 MG/1; MG/1
TABLET ORAL
Qty: 60 TABLET | Refills: 0 | OUTPATIENT
Start: 2022-10-24

## 2022-10-28 ENCOUNTER — OFFICE VISIT (OUTPATIENT)
Dept: CARDIOLOGY | Facility: CLINIC | Age: 67
End: 2022-10-28

## 2022-10-28 VITALS
OXYGEN SATURATION: 98 % | HEART RATE: 64 BPM | HEIGHT: 70 IN | BODY MASS INDEX: 34.93 KG/M2 | DIASTOLIC BLOOD PRESSURE: 78 MMHG | WEIGHT: 244 LBS | SYSTOLIC BLOOD PRESSURE: 124 MMHG

## 2022-10-28 DIAGNOSIS — I26.02 ACUTE SADDLE PULMONARY EMBOLISM WITH ACUTE COR PULMONALE: Primary | ICD-10-CM

## 2022-10-28 DIAGNOSIS — I10 ESSENTIAL HYPERTENSION: Chronic | ICD-10-CM

## 2022-10-28 DIAGNOSIS — E78.5 HYPERLIPIDEMIA, UNSPECIFIED HYPERLIPIDEMIA TYPE: ICD-10-CM

## 2022-10-28 LAB
ALBUMIN SERPL-MCNC: 4.2 G/DL (ref 3.5–5.2)
ALBUMIN/GLOB SERPL: 1.4 G/DL
ALP SERPL-CCNC: 58 U/L (ref 39–117)
ALT SERPL-CCNC: 10 U/L (ref 1–33)
AST SERPL-CCNC: 16 U/L (ref 1–32)
BILIRUB SERPL-MCNC: 0.5 MG/DL (ref 0–1.2)
BUN SERPL-MCNC: 22 MG/DL (ref 8–23)
BUN/CREAT SERPL: 17.9 (ref 7–25)
CALCIUM SERPL-MCNC: 10 MG/DL (ref 8.6–10.5)
CHLORIDE SERPL-SCNC: 103 MMOL/L (ref 98–107)
CHOLEST SERPL-MCNC: 173 MG/DL (ref 0–200)
CO2 SERPL-SCNC: 28.9 MMOL/L (ref 22–29)
CREAT SERPL-MCNC: 1.23 MG/DL (ref 0.57–1)
EGFRCR SERPLBLD CKD-EPI 2021: 48.9 ML/MIN/1.73
ERYTHROCYTE [DISTWIDTH] IN BLOOD BY AUTOMATED COUNT: 13 % (ref 12.3–15.4)
GLOBULIN SER CALC-MCNC: 3 GM/DL
GLUCOSE SERPL-MCNC: 104 MG/DL (ref 65–99)
HCT VFR BLD AUTO: 37.5 % (ref 34–46.6)
HDLC SERPL-MCNC: 32 MG/DL (ref 40–60)
HGB BLD-MCNC: 12.6 G/DL (ref 12–15.9)
LDLC SERPL CALC-MCNC: 114 MG/DL (ref 0–100)
MCH RBC QN AUTO: 30.4 PG (ref 26.6–33)
MCHC RBC AUTO-ENTMCNC: 33.6 G/DL (ref 31.5–35.7)
MCV RBC AUTO: 90.4 FL (ref 79–97)
PLATELET # BLD AUTO: 305 10*3/MM3 (ref 140–450)
POTASSIUM SERPL-SCNC: 4 MMOL/L (ref 3.5–5.2)
PROT SERPL-MCNC: 7.2 G/DL (ref 6–8.5)
RBC # BLD AUTO: 4.15 10*6/MM3 (ref 3.77–5.28)
SODIUM SERPL-SCNC: 142 MMOL/L (ref 136–145)
TRIGL SERPL-MCNC: 151 MG/DL (ref 0–150)
VLDLC SERPL CALC-MCNC: 27 MG/DL (ref 5–40)
WBC # BLD AUTO: 7.25 10*3/MM3 (ref 3.4–10.8)

## 2022-10-28 PROCEDURE — 99214 OFFICE O/P EST MOD 30 MIN: CPT | Performed by: INTERNAL MEDICINE

## 2022-10-28 RX ORDER — DILTIAZEM HYDROCHLORIDE 180 MG/1
180 CAPSULE, COATED, EXTENDED RELEASE ORAL
Qty: 90 CAPSULE | Refills: 3
Start: 2022-10-28 | End: 2023-01-25 | Stop reason: SDUPTHER

## 2022-10-28 RX ORDER — LISINOPRIL AND HYDROCHLOROTHIAZIDE 20; 12.5 MG/1; MG/1
1 TABLET ORAL 2 TIMES DAILY
Qty: 180 TABLET | Refills: 3
Start: 2022-10-28 | End: 2022-11-11

## 2022-10-28 RX ORDER — CARVEDILOL 25 MG/1
25 TABLET ORAL 2 TIMES DAILY
Qty: 180 TABLET | Refills: 3 | Status: SHIPPED | OUTPATIENT
Start: 2022-10-28

## 2022-10-28 NOTE — ASSESSMENT & PLAN NOTE
· Presently without symptoms  · Repeat echo to reassess RVSP  · Recommend lifelong anticoagulation due to large thrombus burden  · Reduce apixaban dose to 2.5 mg twice daily (VTE prophylaxis dose)

## 2022-10-28 NOTE — PROGRESS NOTES
Cardiology Outpatient Visit      Identification: Jessica Murillo is a 67 y.o. female who resides in [place]    Reason for visit:  · Please submassive pulmonary embolism    Assessment     Problem List Items Addressed This Visit        Cardiology Problems    Essential hypertension (Chronic)    Overview     • Target blood pressure <130/80 mmHg         Current Assessment & Plan     · Controlled  · Continue present medical therapy         Relevant Medications    lisinopril-hydrochlorothiazide (PRINZIDE,ZESTORETIC) 20-12.5 MG per tablet    dilTIAZem CD (CARDIZEM CD) 180 MG 24 hr capsule    carvedilol (COREG) 25 MG tablet    History of massive pulmonary embolism with acute cor pulmonale - Primary    Overview     · Echo (8/8/2019): LVEF >55%.  Dilated RV with moderate TR. RVSP 60 mmHg. Dilated RV with RV hypokinesis.  Pericardial effusion present.  · CT chest (8/9/2019): Saddle pulmonary embolus with right heart strain.  · Successful mechanical thrombectomy of bilateral pulmonary artery, 8/10/2019         Current Assessment & Plan     · Presently without symptoms  · Repeat echo to reassess RVSP  · Recommend lifelong anticoagulation due to large thrombus burden  · Reduce apixaban dose to 2.5 mg twice daily (VTE prophylaxis dose)         Relevant Medications    dilTIAZem CD (CARDIZEM CD) 180 MG 24 hr capsule    carvedilol (COREG) 25 MG tablet    apixaban (ELIQUIS) 2.5 MG tablet tablet    Hyperlipidemia    Current Assessment & Plan     · Obtain CMP and lipid         Relevant Orders    Comprehensive Metabolic Panel    LDL Cholesterol, Direct    CBC (No Diff)    Lipid Panel       Plan   • Reduce apixaban dose to 2.5 mg twice daily (VTE prophylaxis) and continue indefinitely due to history of large pulmonary embolus  • Obtain CMP, CBC, lipids today  • Establish care with CHUCHO Huston as previously scheduled  • Echocardiogram to be performed as previously ordered  • If RVSP improved, she may follow-up with me  "on an as-needed basis.              Subjective      Ms. Murillo returns to the office today.  She has been doing well denies any new symptoms.  She asks for refills on her medications.  I do not have any recent blood work on the patient.  She previously saw Gissel Lim for primary care but Gissel would not prescribe her antihypertensive medications.  She is now switching to Amparo King, who she is scheduled to see on 11/8/2022.    With regards to her previous pulmonary embolism, I recommended she undergo repeat echo to reassess RVSP at her last visit with me.  She was unable to get this done and has difficulty arranging transportation.  I asked that she get echo done once again and she will try.    Review of Systems   Constitutional: Negative for malaise/fatigue.   Eyes: Negative for vision loss in left eye and vision loss in right eye.   Cardiovascular: Negative for chest pain, dyspnea on exertion, near-syncope, orthopnea, palpitations, paroxysmal nocturnal dyspnea and syncope.   Musculoskeletal: Negative for myalgias.   Neurological: Negative for brief paralysis, excessive daytime sleepiness, focal weakness, numbness, paresthesias and weakness.   All other systems reviewed and are negative.      Objective     /78 (BP Location: Right arm, Patient Position: Sitting)   Pulse 64   Ht 177.8 cm (70\")   Wt 111 kg (244 lb)   SpO2 98%   BMI 35.01 kg/m²       Constitutional:       Appearance: Healthy appearance. Well-developed.   Eyes:      General: Lids are normal. No scleral icterus.  HENT:      Head: Normocephalic and atraumatic.   Neck:      Thyroid: No thyroid mass.      Vascular: No carotid bruit or JVD. JVD normal.   Pulmonary:      Effort: Pulmonary effort is normal.      Breath sounds: Normal breath sounds.   Cardiovascular:      Normal rate. Regular rhythm.      Murmurs: There is no murmur.      No gallop.   Edema:     Ankle: bilateral 2+ edema of the ankle.  Musculoskeletal:      Extremities: No " clubbing present.Skin:     General: Skin is warm and dry. There is no cyanosis.   Neurological:      General: No focal deficit present.      Mental Status: Alert.   Psychiatric:         Attention and Perception: Attention normal.         Behavior: Behavior normal. Behavior is cooperative.          Result Review  (reviewed with patient):            Lab Results   Component Value Date    GLUCOSE 94 04/14/2021    BUN 19 04/14/2021    CREATININE 0.97 04/14/2021    EGFRIFNONA 58 (L) 04/14/2021    EGFRIFAFRI 70 04/14/2021    BCR 19.6 04/14/2021    K 4.4 04/14/2021    CO2 26.9 04/14/2021    CALCIUM 9.7 04/14/2021    PROTENTOTREF 7.0 04/14/2021    ALBUMIN 4.40 04/14/2021    LABIL2 1.7 04/14/2021    AST 17 04/14/2021    ALT 8 04/14/2021     Lab Results   Component Value Date    WBC 5.96 04/14/2021    HGB 13.8 04/14/2021    HCT 41.2 04/14/2021    MCV 90.7 04/14/2021     04/14/2021     Lab Results   Component Value Date    CHOL 134 08/09/2019    TRIG 96 04/14/2021    HDL 42 04/14/2021     (H) 04/14/2021     (H) 04/14/2021    AST 17 04/14/2021    ALT 8 04/14/2021     Lab Results   Component Value Date    HGBA1C 5.30 08/08/2019         Vincenzo España MD, FACC, [facility]  10/28/2022

## 2022-10-28 NOTE — PROGRESS NOTES
[facility] at [facility]  Outpatient Follow-up Visit    Jessica Murillo  1955  PCP: Makenzie Ledezma APRN      ID:  Jessica Murillo is a 67 y.o. female from ***    Chief Complaint   Patient presents with   • Hypertension            The patient returns today in follow-up of ***            Allergies   Allergen Reactions   • Sulfa Antibiotics Nausea Only   • Ceclor [Cefaclor] Rash       Current Outpatient Medications:   •  acetaminophen (TYLENOL) 500 MG tablet, Take 500 mg by mouth Every 6 (Six) Hours As Needed for Mild Pain ., Disp: , Rfl:   •  carvedilol (COREG) 25 MG tablet, TAKE 1 TABLET BY MOUTH TWICE A DAY, Disp: 180 tablet, Rfl: 3  •  cholecalciferol (VITAMIN D3) 1000 units tablet, Take 1,000 Units by mouth Daily., Disp: , Rfl:   •  cyclobenzaprine (FLEXERIL) 10 MG tablet, Take 10 mg by mouth Daily. Takes as needed. Last dose was wednesday, Disp: , Rfl:   •  diltiaZEM CD (CARDIZEM CD) 180 MG 24 hr capsule, Take 1 capsule by mouth Daily., Disp: 30 capsule, Rfl: 0  •  Eliquis 5 MG tablet tablet, TAKE 1 TABLET BY MOUTH EVERY 12 HOURS, Disp: 180 tablet, Rfl: 3  •  lisinopril-hydrochlorothiazide (PRINZIDE,ZESTORETIC) 20-12.5 MG per tablet, TAKE 1 TABLET BY MOUTH TWICE A DAY, Disp: 60 tablet, Rfl: 0  •  vitamin B-12 (CYANOCOBALAMIN) 500 MCG tablet, Take 500 mcg by mouth Daily., Disp: , Rfl:   •  vitamin C (ASCORBIC ACID) 500 MG tablet, Take 500 mg by mouth Daily., Disp: , Rfl:     Past Medical History, Past Surgical History, Family history, Social History, and Medications were all reviewed with the patient today and updated as necessary.     Past Medical History:   Diagnosis Date   • Acute kidney injury (HCC) 8/8/2019   • Arthritis    • Hypertension 05/22/2018   • Kidney stone 05/22/2018   • Renal disorder 05/22/2018     Past Surgical History:   Procedure Laterality Date   • CARDIAC CATHETERIZATION N/A 8/9/2019    Procedure: Right Heart Cath;  Surgeon: Ousmane Napier,  MD;  Location: [facility] CATH INVASIVE LOCATION;  Service: Cardiovascular   • CARDIAC CATHETERIZATION  8/9/2019    Procedure: Percutaneous Manual Thrombectomy;  Surgeon: Ousmane Napier MD;  Location: [facility] CATH INVASIVE LOCATION;  Service: Cardiovascular   • KIDNEY STONE SURGERY     • NECK SURGERY     • SKIN BIOPSY       Family History   Problem Relation Age of Onset   • Aneurysm Mother    • No Known Problems Father      Social History     Tobacco Use   • Smoking status: Never   • Smokeless tobacco: Never   Substance Use Topics   • Alcohol use: No       Review of Systems   All other systems reviewed and are negative.              There were no vitals taken for this visit.       Wt Readings from Last 5 Encounters:   08/03/22 111 kg (245 lb)   06/06/22 113 kg (250 lb)   02/12/22 111 kg (244 lb)   04/14/21 117 kg (258 lb 12.8 oz)   05/19/20 118 kg (261 lb)       BP Readings from Last 5 Encounters:   08/03/22 120/82   06/06/22 139/79   02/12/22 147/86   04/14/21 178/88   05/19/20 168/90       Physical Exam    EKG: (EKG has been independently visualized by me and summarized below)    Procedures       No results found for this or any previous visit (from the past 672 hour(s)).         Problem List Items Addressed This Visit    None           · ***  · No follow-ups on file.          JULIO España M.D., [facility], [facility]  Interventional Cardiology  10/28/2022  13:02 EDT

## 2022-11-08 ENCOUNTER — OFFICE VISIT (OUTPATIENT)
Dept: INTERNAL MEDICINE | Facility: CLINIC | Age: 67
End: 2022-11-08

## 2022-11-08 VITALS
HEIGHT: 70 IN | SYSTOLIC BLOOD PRESSURE: 146 MMHG | TEMPERATURE: 97.2 F | HEART RATE: 81 BPM | DIASTOLIC BLOOD PRESSURE: 86 MMHG | BODY MASS INDEX: 34.79 KG/M2 | OXYGEN SATURATION: 98 % | WEIGHT: 243 LBS

## 2022-11-08 DIAGNOSIS — E78.5 HYPERLIPIDEMIA, UNSPECIFIED HYPERLIPIDEMIA TYPE: ICD-10-CM

## 2022-11-08 DIAGNOSIS — I10 ESSENTIAL HYPERTENSION: Chronic | ICD-10-CM

## 2022-11-08 DIAGNOSIS — N18.2 STAGE 2 CHRONIC KIDNEY DISEASE: ICD-10-CM

## 2022-11-08 DIAGNOSIS — Z84.0: ICD-10-CM

## 2022-11-08 DIAGNOSIS — I44.7 LBBB (LEFT BUNDLE BRANCH BLOCK): ICD-10-CM

## 2022-11-08 DIAGNOSIS — I26.02 ACUTE SADDLE PULMONARY EMBOLISM WITH ACUTE COR PULMONALE: ICD-10-CM

## 2022-11-08 DIAGNOSIS — Z76.89 ENCOUNTER TO ESTABLISH CARE: Primary | ICD-10-CM

## 2022-11-08 DIAGNOSIS — Z87.442 PERSONAL HISTORY OF KIDNEY STONES: ICD-10-CM

## 2022-11-08 DIAGNOSIS — M79.89 SOFT TISSUE MASS: ICD-10-CM

## 2022-11-08 PROCEDURE — 99204 OFFICE O/P NEW MOD 45 MIN: CPT | Performed by: NURSE PRACTITIONER

## 2022-11-08 NOTE — PROGRESS NOTES
Date: 2022    Name: Jessica Murillo  : 1955    Chief Complaint:   Chief Complaint   Patient presents with   • Establish Care     Needs referral for urologist       HPI:  Jessica Murillo is a 67 y.o. female presents to establish care. Was seeing CHUCHO Skaggs.     Shattered C5 after breaking her neck over a decade ago.  Was prescribed NSAIDs with subsequent kidney damage.  History of kidney stones.     HTN, hyperlipidemia, LBBB, H/O blood clots: follows cardiology, Dr España.  Eliquis dose decreased to 2.5mg BID from 5 mg BID 10/28/22.  Continues to take carvedilol 25 mg BID, diltiazem 180 mg daily, lisinopril-hctz 20-12.5 mg daily. Denies chest pain, dyspnea, orthopnea, palpitations, lower extremity edema, confusion, headaches, weakness, visual disturbances.    Family history of lupus, mother had erythema nodosum.  Daughter has fibromyalgia.  Ciarra has noticed a lump on her anterior right thigh, right lower back that has been present for a long time. Lump on her right anterior thigh waxes and wanes in size.        History:    Past Medical History:   Diagnosis Date   • Acute kidney injury (HCC) 2019   • Arthritis    • History of blood clots    • Hypertension 2018   • Kidney disorder    • Kidney stone 2018   • Osteoporosis    • Ovarian cyst    • Renal disorder 2018   • Thyroid disorder        Past Surgical History:   Procedure Laterality Date   • CARDIAC CATHETERIZATION N/A 2019    Procedure: Right Heart Cath;  Surgeon: Ousmane Napier MD;  Location: [facility] CATH INVASIVE LOCATION;  Service: Cardiovascular   • CARDIAC CATHETERIZATION  2019    Procedure: Percutaneous Manual Thrombectomy;  Surgeon: Ousmane Napier MD;  Location: [facility] CATH INVASIVE LOCATION;  Service: Cardiovascular   • CHOLECYSTECTOMY     • KIDNEY STONE SURGERY     • NECK SURGERY     • SKIN BIOPSY         Family History   Problem Relation Age of Onset   • Aneurysm Mother   "  • No Known Problems Father    • Arthritis Other    • Hyperlipidemia Other    • Hypertension Other    • Obesity Other    • Osteoporosis Other        Social History     Socioeconomic History   • Marital status:    Tobacco Use   • Smoking status: Never   • Smokeless tobacco: Never   Substance and Sexual Activity   • Alcohol use: No   • Drug use: Yes     Types: Marijuana   • Sexual activity: Defer       Allergies   Allergen Reactions   • Haemophilus Influenzae Vaccines Other (See Comments)     Site reaction   • Sulfa Antibiotics Nausea Only   • Ceclor [Cefaclor] Rash         Current Outpatient Medications:   •  acetaminophen (TYLENOL) 500 MG tablet, Take 500 mg by mouth Every 6 (Six) Hours As Needed for Mild Pain ., Disp: , Rfl:   •  apixaban (ELIQUIS) 2.5 MG tablet tablet, Take 1 tablet by mouth Every 12 (Twelve) Hours., Disp: 180 tablet, Rfl: 3  •  carvedilol (COREG) 25 MG tablet, Take 1 tablet by mouth 2 (Two) Times a Day., Disp: 180 tablet, Rfl: 3  •  cholecalciferol (VITAMIN D3) 1000 units tablet, Take 1,000 Units by mouth Daily., Disp: , Rfl:   •  cyclobenzaprine (FLEXERIL) 10 MG tablet, Take 10 mg by mouth Daily. Takes as needed. Last dose was wednesday, Disp: , Rfl:   •  dilTIAZem CD (CARDIZEM CD) 180 MG 24 hr capsule, Take 1 capsule by mouth Daily., Disp: 90 capsule, Rfl: 3  •  vitamin B-12 (CYANOCOBALAMIN) 500 MCG tablet, Take 500 mcg by mouth Daily., Disp: , Rfl:   •  vitamin C (ASCORBIC ACID) 500 MG tablet, Take 500 mg by mouth Daily., Disp: , Rfl:   •  lisinopril-hydrochlorothiazide (PRINZIDE,ZESTORETIC) 20-12.5 MG per tablet, TAKE 1 TABLET BY MOUTH TWICE A DAY, Disp: 180 tablet, Rfl: 0    VS:  Vitals:    11/08/22 0852   BP: 146/86   Pulse: 81   Temp: 97.2 °F (36.2 °C)   SpO2: 98%   Weight: 110 kg (243 lb)   Height: 177.8 cm (70\")     Body mass index is 34.87 kg/m².    PE:  Physical Exam  Constitutional:       Appearance: She is not ill-appearing.   HENT:      Head: Normocephalic.      Right Ear: " External ear normal.      Left Ear: External ear normal.   Eyes:      Conjunctiva/sclera: Conjunctivae normal.      Pupils: Pupils are equal, round, and reactive to light.   Cardiovascular:      Rate and Rhythm: Normal rate and regular rhythm.      Pulses:           Radial pulses are 2+ on the right side and 2+ on the left side.        Dorsalis pedis pulses are 2+ on the right side and 2+ on the left side.      Heart sounds: Normal heart sounds.   Pulmonary:      Effort: Pulmonary effort is normal.      Breath sounds: Normal breath sounds.   Musculoskeletal:      Cervical back: Normal range of motion and neck supple.      Right lower le+ Edema present.      Left lower le+ Edema present.   Skin:     General: Skin is warm.      Capillary Refill: Capillary refill takes less than 2 seconds.   Neurological:      Mental Status: She is alert and oriented to person, place, and time.      Coordination: Coordination normal.      Gait: Gait normal.   Psychiatric:         Mood and Affect: Mood normal.         Behavior: Behavior normal.         Thought Content: Thought content normal.         Assessment/Plan:         Diagnoses and all orders for this visit:    1. Encounter to establish care (Primary)    2. History of massive pulmonary embolism with acute cor pulmonale        - Continue Eliquis as prescribed     3. LBBB (left bundle branch block)  4. Hyperlipidemia, unspecified hyperlipidemia type  5. Essential hypertension        - Follow heart healthy diet.  Keep sodium intake < 1500 mg per day.  Avoid processed & fast foods.          - Exercise as tolerated, with a goal of 30 minutes of moderate exercise most days.         - Take medications as prescribed.    6. Stage 2 chronic kidney disease  -     Cancel: Ambulatory Referral to Nephrology    7. Personal history of kidney stones  -     Ambulatory Referral to Urology    8. Soft tissue mass  -     KELLEN  -     C-reactive Protein  -     Sedimentation rate, automated    9.  Family history of erythema nodosum  -     KELLEN  -     C-reactive Protein  -     Sedimentation rate, automated      I spent 52 minutes caring for Jessica on this date of service. This time includes time spent by me in the following activities:preparing for the visit, reviewing tests, obtaining and/or reviewing a separately obtained history, performing a medically appropriate examination and/or evaluation , counseling and educating the patient/family/caregiver, ordering medications, tests, or procedures, referring and communicating with other health care professionals  and documenting information in the medical record    Return in about 4 weeks (around 12/6/2022) for Medicare Wellness.

## 2022-11-09 LAB
ANA SER QL: NEGATIVE
CRP SERPL-MCNC: <0.3 MG/DL (ref 0–0.5)
ERYTHROCYTE [SEDIMENTATION RATE] IN BLOOD BY WESTERGREN METHOD: 25 MM/HR (ref 0–30)

## 2022-11-11 DIAGNOSIS — I10 ESSENTIAL HYPERTENSION: Chronic | ICD-10-CM

## 2022-11-11 RX ORDER — LISINOPRIL AND HYDROCHLOROTHIAZIDE 20; 12.5 MG/1; MG/1
TABLET ORAL
Qty: 180 TABLET | Refills: 0 | Status: SHIPPED | OUTPATIENT
Start: 2022-11-11 | End: 2022-12-23

## 2022-11-11 NOTE — TELEPHONE ENCOUNTER
Lab Results   Component Value Date    GLUCOSE 104 (H) 10/28/2022    BUN 22 10/28/2022    CREATININE 1.23 (H) 10/28/2022    EGFRIFNONA 58 (L) 04/14/2021    EGFRIFAFRI 70 04/14/2021    BCR 17.9 10/28/2022    K 4.0 10/28/2022    CO2 28.9 10/28/2022    CALCIUM 10.0 10/28/2022    PROTENTOTREF 7.2 10/28/2022    ALBUMIN 4.20 10/28/2022    LABIL2 1.4 10/28/2022    AST 16 10/28/2022    ALT 10 10/28/2022

## 2022-11-22 ENCOUNTER — PATIENT ROUNDING (BHMG ONLY) (OUTPATIENT)
Dept: UROLOGY | Facility: CLINIC | Age: 67
End: 2022-11-22

## 2022-11-22 ENCOUNTER — OFFICE VISIT (OUTPATIENT)
Dept: UROLOGY | Facility: CLINIC | Age: 67
End: 2022-11-22

## 2022-11-22 VITALS
WEIGHT: 243 LBS | OXYGEN SATURATION: 99 % | HEART RATE: 74 BPM | RESPIRATION RATE: 17 BRPM | HEIGHT: 70 IN | TEMPERATURE: 97.9 F | DIASTOLIC BLOOD PRESSURE: 90 MMHG | BODY MASS INDEX: 34.79 KG/M2 | SYSTOLIC BLOOD PRESSURE: 145 MMHG

## 2022-11-22 DIAGNOSIS — N18.2 STAGE 2 CHRONIC KIDNEY DISEASE: Primary | ICD-10-CM

## 2022-11-22 PROCEDURE — 99213 OFFICE O/P EST LOW 20 MIN: CPT | Performed by: NURSE PRACTITIONER

## 2022-11-22 NOTE — PROGRESS NOTES
Office Visit General Female New      Patient Name: Jessica Murillo  : 1955   MRN: [id number]     Chief Complaint:   Chief Complaint   Patient presents with   • Nephrolithiasis       Referring Provider: Amparo King, *    History of Present Illness: Ms. Murillo is a 67 y.o. female with below past medical history who presents with CKD2. Wants to establish care to have labs monitored. Patient took 800 mg ibuprofen x1 year and this caused her to develop CKD stage II.  She has a history of kidney stone x1 approximately 10 to 15 years ago no kidney stones since.    She denies incontinence or kidney stone symptoms      Subjective      Review of System:   Constitutional: No fevers or chills  Genitourinary: Negative for new lower urinary tract symptoms, current gross hematuria or dysuria.    Past Medical History:   Past Medical History:   Diagnosis Date   • Acute kidney injury (HCC) 2019   • Arthritis    • History of blood clots    • Hypertension 2018   • Kidney disorder    • Kidney stone 2018   • Osteoporosis    • Ovarian cyst    • Renal disorder 2018   • Thyroid disorder        Past Surgical History:   Past Surgical History:   Procedure Laterality Date   • CARDIAC CATHETERIZATION N/A 2019    Procedure: Right Heart Cath;  Surgeon: Ousmane Napier MD;  Location: [facility] CATH INVASIVE LOCATION;  Service: Cardiovascular   • CARDIAC CATHETERIZATION  2019    Procedure: Percutaneous Manual Thrombectomy;  Surgeon: Ousmane Napier MD;  Location: [facility] CATH INVASIVE LOCATION;  Service: Cardiovascular   • CHOLECYSTECTOMY     • KIDNEY STONE SURGERY     • NECK SURGERY     • SKIN BIOPSY         Family History:   Family History   Problem Relation Age of Onset   • Aneurysm Mother    • No Known Problems Father    • Arthritis Other    • Hyperlipidemia Other    • Hypertension Other    • Obesity Other    • Osteoporosis Other        Social History:   Social  "History     Socioeconomic History   • Marital status:    Tobacco Use   • Smoking status: Never   • Smokeless tobacco: Never   Substance and Sexual Activity   • Alcohol use: No   • Drug use: Yes     Types: Marijuana   • Sexual activity: Defer       Medications:     Current Outpatient Medications:   •  acetaminophen (TYLENOL) 500 MG tablet, Take 500 mg by mouth Every 6 (Six) Hours As Needed for Mild Pain ., Disp: , Rfl:   •  apixaban (ELIQUIS) 2.5 MG tablet tablet, Take 1 tablet by mouth Every 12 (Twelve) Hours., Disp: 180 tablet, Rfl: 3  •  carvedilol (COREG) 25 MG tablet, Take 1 tablet by mouth 2 (Two) Times a Day., Disp: 180 tablet, Rfl: 3  •  cholecalciferol (VITAMIN D3) 1000 units tablet, Take 1,000 Units by mouth Daily., Disp: , Rfl:   •  cyclobenzaprine (FLEXERIL) 10 MG tablet, Take 10 mg by mouth Daily. Takes as needed. Last dose was wednesday, Disp: , Rfl:   •  dilTIAZem CD (CARDIZEM CD) 180 MG 24 hr capsule, Take 1 capsule by mouth Daily., Disp: 90 capsule, Rfl: 3  •  lisinopril-hydrochlorothiazide (PRINZIDE,ZESTORETIC) 20-12.5 MG per tablet, TAKE 1 TABLET BY MOUTH TWICE A DAY, Disp: 180 tablet, Rfl: 0  •  vitamin B-12 (CYANOCOBALAMIN) 500 MCG tablet, Take 500 mcg by mouth Daily., Disp: , Rfl:   •  vitamin C (ASCORBIC ACID) 500 MG tablet, Take 500 mg by mouth Daily., Disp: , Rfl:     Allergies:   Allergies   Allergen Reactions   • Haemophilus Influenzae Vaccines Other (See Comments)     Site reaction   • Sulfa Antibiotics Nausea Only   • Ceclor [Cefaclor] Rash       Objective     Physical Exam:   Vital Signs:   Vitals:    11/22/22 0918   BP: 145/90   BP Location: Left arm   Patient Position: Sitting   Cuff Size: Adult   Pulse: 74   Resp: 17   Temp: 97.9 °F (36.6 °C)   TempSrc: Temporal   SpO2: 99%   Weight: 110 kg (243 lb)   Height: 177.8 cm (70\")     Body mass index is 34.87 kg/m².     Constitutional: NAD, WDWN.   Neurological: A + O x 3  Psychiatric:  Normal mood and affect    Labs  Brief Urine Lab " Results     None               Lab Results   Component Value Date    GLUCOSE 104 (H) 10/28/2022    CALCIUM 10.0 10/28/2022     10/28/2022    K 4.0 10/28/2022    CO2 28.9 10/28/2022     10/28/2022    BUN 22 10/28/2022    CREATININE 1.23 (H) 10/28/2022    EGFRIFAFRI 70 04/14/2021    EGFRIFNONA 58 (L) 04/14/2021    BCR 17.9 10/28/2022    ANIONGAP 16.1 (H) 08/10/2019       Lab Results   Component Value Date    WBC 7.25 10/28/2022    HGB 12.6 10/28/2022    HCT 37.5 10/28/2022    MCV 90.4 10/28/2022     10/28/2022         Assessment / Plan      .Assessment  Ms. Murillo is a 67 y.o. female with HTN and chronic kidney disease.  With further discussion patient has no symptoms of kidney stone and has not had kidney stones in many years she is seeking management for her CKD.  We discussed this needs to be done by nephrology not urology.  I recommend seeing CHUCHO Jovel at Dr. Rubi's office.  Patient will follow-up with urology as needed    Plan  1.  Referral to nephrology    Follow Up:   Return if symptoms worsen or fail to improve.    CHUCHO Holden, NP-C  [facility]

## 2022-11-26 NOTE — PROGRESS NOTES
November 25, 2022    Hello, may I speak with Jessica Sherif Murillo? Spoke with patient.    My name is Shweta, practice manager.    I am  with [facility]  [address]  [address]  [facility] [address] [phone].    Before we get started may I verify your date of birth? 1955    I am calling to officially welcome you to our practice and ask about your recent visit. Is this a good time to talk? Yes.    Tell me about your visit with us. What things went well? Patient said everything went fine but her PCP sent her to the wrong doctor, she needed a nephrologist and not urology. Ms. Murillo was pleased with our staff and Candice and said she was treated well and would come see us if she ever needed us.       We're always looking for ways to make our patients' experiences even better. Do you have recommendations on ways we may improve? no.    Overall were you satisfied with your first visit to our practice? Yes.       I appreciate you taking the time to speak with me today. Is there anything else I can do for you? No.      Thank you, and have a great day.

## 2022-12-01 ENCOUNTER — TELEPHONE (OUTPATIENT)
Dept: INTERNAL MEDICINE | Facility: CLINIC | Age: 67
End: 2022-12-01

## 2022-12-01 NOTE — TELEPHONE ENCOUNTER
Caller: Jessica Murillo    Relationship: Self    Best call back number: [phone]    What is the best time to reach you: ANYTIME    Who are you requesting to speak with (clinical staff, provider,  specific staff member): CLINICAL STAFF    Do you know the name of the person who called: SELF    What was the call regarding: PATIENT STATES THAT SHE WOULD LIKE A CALL ABOUT THE SYMPTOMS THAT SHE IS HAVING LIGHTHEADED, RIGHT EARACHE, COUGH, WEIGHT LOSS, LOSS OF APPETITE, AND NAUSEA.    Do you require a callback: YES

## 2022-12-01 NOTE — TELEPHONE ENCOUNTER
Spoke with patient, states she has had been having s/s for 5 days.  Advised to proceed to UTC.  Patient declined.  States she will proceed to ER if s/s worsen.

## 2022-12-23 DIAGNOSIS — I10 ESSENTIAL HYPERTENSION: Chronic | ICD-10-CM

## 2022-12-23 RX ORDER — LISINOPRIL AND HYDROCHLOROTHIAZIDE 20; 12.5 MG/1; MG/1
TABLET ORAL
Qty: 180 TABLET | Refills: 0 | Status: SHIPPED | OUTPATIENT
Start: 2022-12-23

## 2022-12-23 NOTE — TELEPHONE ENCOUNTER
Lab Results   Component Value Date    GLUCOSE 104 (H) 10/28/2022    BUN 22 10/28/2022    CREATININE 1.23 (H) 10/28/2022    EGFRIFNONA 58 (L) 04/14/2021    EGFRIFAFRI 70 04/14/2021    BCR 17.9 10/28/2022    K 4.0 10/28/2022    CO2 28.9 10/28/2022    CALCIUM 10.0 10/28/2022    PROTENTOTREF 7.2 10/28/2022    ALBUMIN 4.20 10/28/2022    LABIL2 1.4 10/28/2022    AST 16 10/28/2022    ALT 10 10/28/2022     ADDITIONAL REFILLS FROM PCP. SEEING PRN ONLY.

## 2023-01-25 ENCOUNTER — TELEPHONE (OUTPATIENT)
Dept: INTERNAL MEDICINE | Facility: CLINIC | Age: 68
End: 2023-01-25
Payer: MEDICARE

## 2023-01-25 DIAGNOSIS — I10 ESSENTIAL HYPERTENSION: Chronic | ICD-10-CM

## 2023-01-25 RX ORDER — DILTIAZEM HYDROCHLORIDE 180 MG/1
180 CAPSULE, COATED, EXTENDED RELEASE ORAL
Qty: 90 CAPSULE | Refills: 1 | Status: SHIPPED | OUTPATIENT
Start: 2023-01-25

## 2023-01-25 RX ORDER — DILTIAZEM HYDROCHLORIDE 180 MG/1
180 CAPSULE, COATED, EXTENDED RELEASE ORAL
Qty: 90 CAPSULE | Refills: 0 | Status: SHIPPED | OUTPATIENT
Start: 2023-01-25 | End: 2023-01-25 | Stop reason: SDUPTHER

## 2023-01-25 NOTE — TELEPHONE ENCOUNTER
Caller: Jessica Murillo    Relationship: Self    Best call back number:  [phone]    Requested Prescriptions:   Requested Prescriptions      No prescriptions requested or ordered in this encounter      dilTIAZem CD (CARDIZEM CD) 180 MG 24 hr capsule    Pharmacy where request should be sent:      [facility]/pharmacy #[id number] [address] [phone] [facility] [phone]      Additional details provided by patient:     CAN SHE GET IS FOR 90 DAYS? DR JENKINS HAS NOT CALLED THIS IN.  COMPLETELY OUT OF MEDICATION    Does the patient have less than a 3 day supply:  [x] Yes  [] No    Would you like a call back once the refill request has been completed: [x] Yes [] No    If the office needs to give you a call back, can they leave a voicemail: [] Yes [x] No    Kaila Ascencio Rep   01/25/23 12:47 EST

## 2023-01-25 NOTE — TELEPHONE ENCOUNTER
I called and spoke with patient advised that this medication comes from cardiology. Patient states that cardiologist informed her that her PCP could refill?

## 2023-03-02 ENCOUNTER — HOSPITAL ENCOUNTER (EMERGENCY)
Facility: HOSPITAL | Age: 68
Discharge: HOME | End: 2023-03-02
Attending: EMERGENCY MEDICINE | Admitting: EMERGENCY MEDICINE
Payer: MEDICARE

## 2023-03-02 VITALS
HEIGHT: 70 IN | OXYGEN SATURATION: 97 % | BODY MASS INDEX: 34.65 KG/M2 | RESPIRATION RATE: 18 BRPM | TEMPERATURE: 97.9 F | DIASTOLIC BLOOD PRESSURE: 90 MMHG | WEIGHT: 242 LBS | SYSTOLIC BLOOD PRESSURE: 155 MMHG | HEART RATE: 76 BPM

## 2023-03-02 DIAGNOSIS — K08.89 PAIN, DENTAL: Primary | ICD-10-CM

## 2023-03-02 PROCEDURE — 99283 EMERGENCY DEPT VISIT LOW MDM: CPT

## 2023-03-02 RX ORDER — LIDOCAINE HYDROCHLORIDE 20 MG/ML
10 SOLUTION OROPHARYNGEAL ONCE
Status: COMPLETED | OUTPATIENT
Start: 2023-03-02 | End: 2023-03-02

## 2023-03-02 RX ORDER — AMOXICILLIN AND CLAVULANATE POTASSIUM 875; 125 MG/1; MG/1
1 TABLET, FILM COATED ORAL 2 TIMES DAILY
Qty: 16 TABLET | Refills: 0 | Status: SHIPPED | OUTPATIENT
Start: 2023-03-02 | End: 2023-03-10

## 2023-03-02 RX ADMIN — LIDOCAINE HYDROCHLORIDE 10 ML: 20 SOLUTION ORAL at 13:39

## 2023-03-02 RX ADMIN — TOPICAL ANESTHETIC 1 SPRAY: 200 SPRAY DENTAL; PERIODONTAL at 13:40

## 2023-03-02 NOTE — ED PROVIDER NOTES
"Subjective  History of Present Illness:    This is a 67-year-old female presents emergency room today for chief complaint of dental pain.  Patient additionally reports some right-sided facial swelling around the upper jaw area.  She reports that this has been ongoing since Tuesday night after she ate a [employer] sprouts.  She denies any known fevers or chills.  She reports that she does not have a dentist because she is on Eliquis and \"no dentist will touch her because of this\".  She reports that she saw primary care physician yesterday who placed her on amoxicillin 875 from clinic.  She denies any difficulty swallowing or neck pain or sore throat.  She reports tolerating oral liquids without difficulty.  She has taken 3 doses of her amoxicillin since yesterday.  She was told to take two 500 mg Tylenol tablets per day for pain and she reports little improvement and dental pain.  She denies any known discharge from the tooth.      Nurses Notes reviewed and agree, including vitals, allergies, social history and prior medical history.     REVIEW OF SYSTEMS: All systems reviewed and not pertinent unless noted.  Review of Systems   HENT: Positive for dental problem and facial swelling.    All other systems reviewed and are negative.      Past Medical History:   Diagnosis Date   • Acute kidney injury (HCC) 08/08/2019   • Arthritis    • History of blood clots    • Hypertension 05/22/2018   • Kidney disorder    • Kidney stone 05/22/2018   • Osteoporosis    • Ovarian cyst    • Renal disorder 05/22/2018   • Thyroid disorder        Allergies:    Haemophilus influenzae vaccines, Sulfa antibiotics, and Ceclor [cefaclor]      Past Surgical History:   Procedure Laterality Date   • CARDIAC CATHETERIZATION N/A 08/09/2019    Procedure: Right Heart Cath;  Surgeon: Ousmane Napier MD;  Location: [facility] CATH INVASIVE LOCATION;  Service: Cardiovascular   • CARDIAC CATHETERIZATION  08/09/2019    Procedure: Percutaneous Manual " "Thrombectomy;  Surgeon: Ousmane Napier MD;  Location: [facility] INVASIVE LOCATION;  Service: Cardiovascular   • CHOLECYSTECTOMY     • KIDNEY STONE SURGERY     • NECK SURGERY     • SKIN BIOPSY           Social History     Socioeconomic History   • Marital status:    Tobacco Use   • Smoking status: Never   • Smokeless tobacco: Never   Substance and Sexual Activity   • Alcohol use: No   • Drug use: Yes     Types: Marijuana   • Sexual activity: Defer         Family History   Problem Relation Age of Onset   • Aneurysm Mother    • No Known Problems Father    • Arthritis Other    • Hyperlipidemia Other    • Hypertension Other    • Obesity Other    • Osteoporosis Other        Objective  Physical Exam:  /90 (BP Location: Left arm, Patient Position: Sitting)   Pulse 76   Temp 97.9 °F (36.6 °C) (Oral)   Resp 18   Ht 177.8 cm (70\")   Wt 110 kg (242 lb)   SpO2 97%   BMI 34.72 kg/m²      Physical Exam  Vitals and nursing note reviewed.   Constitutional:       General: She is not in acute distress.     Appearance: Normal appearance. She is obese. She is not ill-appearing, toxic-appearing or diaphoretic.   HENT:      Head: Normocephalic and atraumatic.      Nose: Nose normal. No congestion or rhinorrhea.      Mouth/Throat:      Mouth: Mucous membranes are moist.      Pharynx: Oropharynx is clear. No oropharyngeal exudate.      Comments: No evidence of posterior oropharynx erythema, patient does have pain to palpation of tooth #6, there is erythema of the gum above this without any fluctuance.  Tooth #6 is partially avulsed from appearance.  Poor dentition noted overall throughout the mouth with multiple missing teeth and dental caries.  No evidence of gumline or buccal abscess.   Cardiovascular:      Pulses: Normal pulses.      Comments: Appears well perfused  Pulmonary:      Effort: Pulmonary effort is normal. No respiratory distress.   Musculoskeletal:         General: Normal range of motion.      " "Cervical back: Normal range of motion.   Lymphadenopathy:      Cervical: No cervical adenopathy.   Skin:     General: Skin is warm.      Capillary Refill: Capillary refill takes less than 2 seconds.   Neurological:      General: No focal deficit present.      Mental Status: She is alert and oriented to person, place, and time.   Psychiatric:         Mood and Affect: Mood normal.         Behavior: Behavior normal.         Thought Content: Thought content normal.         Judgment: Judgment normal.           Procedures    ED Course:    ED Course as of 03/02/23 1340   Thu Mar 02, 2023   1329 Given tooth balls for dental pain.  Will need to follow-up with dentist.  Stable for discharge home. [JR]      ED Course User Index  [JR] Kodak Rose PA-C       Lab Results (last 24 hours)     ** No results found for the last 24 hours. **           No radiology results from the last 24 hrs       MDM    Initial impression of presenting illness: This is a 67-year-old female presents emergency room today for chief complaint of dental pain.  Patient additionally reports some right-sided facial swelling around the upper jaw area.  She reports that this has been ongoing since Tuesday night after she ate a [employer].  She denies any known fevers or chills.  She reports that she does not have a dentist because she is on Eliquis and \"no dentist will touch her because of this\".  She reports that she saw primary care physician yesterday who placed her on amoxicillin 875 from clinic.  She denies any difficulty swallowing or neck pain or sore throat.  She reports tolerating oral liquids without difficulty.  She has taken 3 doses of her amoxicillin since yesterday.  She was told to take two 500 mg Tylenol tablets per day for pain and she reports little improvement and dental pain.  She denies any known discharge from the tooth.    DDX: includes but is not limited to: Gumline abscess, gingivitis, dental abscess, tooth avulsion, dental " trauma    Patient arrives hemodynamically stable, afebrile, some hypertension 155/90, nontoxic-appearing with vitals interpreted by myself.     Pertinent features from physical exam:No evidence of posterior oropharynx erythema, patient does have pain to palpation of tooth #6, there is erythema of the gum above this without any fluctuance.  Tooth #6 is partially avulsed from appearance.  Poor dentition noted overall throughout the mouth with multiple missing teeth and dental caries.  No evidence of gumline or buccal abscess.  Uvula midline, no evidence of tonsillar exudate or erythema or edema.  No evidence of peritonsillar abscess.  No cervical adenopathy palpated.    Initial diagnostic plan: No imaging or laboratory studies warranted at today's visit.    Results from initial plan were reviewed and interpreted by me revealing N/A    Diagnostic information from other sources: N/A    Interventions / Re-evaluation: Given tooth balls for dental pain.  No evidence of gumline abscess or buccal abscess that needs to be incised at today's visit.  Will place on stronger antibiotics of Augmentin and discontinue amoxicillin.  Recommend following up with dentist.  Return precautions given.    Results/clinical rationale were discussed with patient at bedside.  She is stable for discharge home at this time.  Discussed supportive care measures for dental pain, discussed discontinuing amoxicillin and beginning Augmentin for further control of dental infection and appropriate treatment of dental infection.  Patient comfortable and agreeable to plan at bedside and given return precautions.  She is understanding that she needs to follow-up with a dentist for this issue.    Consultations/Discussion of results with other physicians: N/A    Disposition plan: Discharge home, sent Augmentin to patient's pharmacy, return precautions given.  Patient will need to follow-up with dentistry.  -----    Final diagnoses:   Pain, dental        Ross,  Kodak GRACE PA-C  03/02/23 2047

## 2023-03-02 NOTE — DISCHARGE INSTRUCTIONS
Return to emergency room for any worsening symptoms, I have sent antibiotic to your pharmacy, make sure to pick this up and take as directed.  Utilize Tylenol and Orajel for dental pain.  Discontinue the amoxicillin and begin the new antibiotic that I sent to your pharmacy.  You need to schedule a follow-up appointment with a dentist in the area.

## 2023-04-26 DIAGNOSIS — I10 ESSENTIAL HYPERTENSION: Chronic | ICD-10-CM

## 2023-04-26 RX ORDER — LISINOPRIL AND HYDROCHLOROTHIAZIDE 20; 12.5 MG/1; MG/1
TABLET ORAL
Qty: 180 TABLET | Refills: 0 | Status: SHIPPED | OUTPATIENT
Start: 2023-04-26

## 2023-04-26 NOTE — TELEPHONE ENCOUNTER
Lab Results   Component Value Date    GLUCOSE 104 (H) 10/28/2022    BUN 22 10/28/2022    CREATININE 1.23 (H) 10/28/2022    EGFRRESULT 48.9 (L) 10/28/2022    BCR 17.9 10/28/2022    K 4.0 10/28/2022    CO2 28.9 10/28/2022    CALCIUM 10.0 10/28/2022    PROTENTOTREF 7.2 10/28/2022    ALBUMIN 4.20 10/28/2022    BILITOT 0.5 10/28/2022    AST 16 10/28/2022    ALT 10 10/28/2022

## 2023-05-20 DIAGNOSIS — I10 ESSENTIAL HYPERTENSION: Chronic | ICD-10-CM

## 2023-05-22 RX ORDER — LISINOPRIL AND HYDROCHLOROTHIAZIDE 20; 12.5 MG/1; MG/1
TABLET ORAL
Qty: 180 TABLET | Refills: 2 | Status: SHIPPED | OUTPATIENT
Start: 2023-05-22

## 2023-08-25 DIAGNOSIS — I10 ESSENTIAL HYPERTENSION: Chronic | ICD-10-CM

## 2023-08-25 RX ORDER — DILTIAZEM HYDROCHLORIDE 180 MG/1
CAPSULE, COATED, EXTENDED RELEASE ORAL
Qty: 90 CAPSULE | Refills: 0 | Status: SHIPPED | OUTPATIENT
Start: 2023-08-25

## 2023-11-13 ENCOUNTER — NURSE TRIAGE (OUTPATIENT)
Dept: CALL CENTER | Facility: HOSPITAL | Age: 68
End: 2023-11-13
Payer: MEDICARE

## 2023-11-13 NOTE — TELEPHONE ENCOUNTER
"Reason for Disposition   Health Information question, no triage required and triager able to answer question    Additional Information   Negative: [1] Caller is not with the adult (patient) AND [2] reporting urgent symptoms   Negative: Lab result questions   Negative: Medication questions   Negative: Caller can't be reached by phone   Negative: Caller has already spoken to PCP or another triager   Negative: RN needs further essential information from caller in order to complete triage   Negative: Requesting regular office appointment   Negative: [1] Caller requesting NON-URGENT health information AND [2] PCP's office is the best resource   Negative: General information question, no triage required and triager able to answer question   Negative: Question about upcoming scheduled test, no triage required and triager able to answer question   Negative: [1] Caller is not with the adult (patient) AND [2] probable NON-URGENT symptoms    Answer Assessment - Initial Assessment Questions  1. REASON FOR CALL or QUESTION: \"What is your reason for calling today?\" or \"How can I best help you?\" or \"What question do you have that I can help answer?\"      Question on poke berries    Protocols used: Information Only Call - No Triage-ADULT-    "

## 2023-11-13 NOTE — TELEPHONE ENCOUNTER
I have poke berry stain on my feet I know they are poision, will I get sick from this, my foot is floppy, little puffiness on a foot, that is why I am concerned, not at same time or same foot, told her to follow up with her PCP with any questions, no information n poke stains.

## 2023-12-11 DIAGNOSIS — I10 ESSENTIAL HYPERTENSION: Chronic | ICD-10-CM

## 2023-12-11 RX ORDER — DILTIAZEM HYDROCHLORIDE 180 MG/1
CAPSULE, COATED, EXTENDED RELEASE ORAL
Qty: 90 CAPSULE | Refills: 0 | OUTPATIENT
Start: 2023-12-11

## 2023-12-12 DIAGNOSIS — I10 ESSENTIAL HYPERTENSION: Chronic | ICD-10-CM

## 2023-12-12 RX ORDER — CARVEDILOL 25 MG/1
25 TABLET ORAL 2 TIMES DAILY
Qty: 180 TABLET | Refills: 3 | OUTPATIENT
Start: 2023-12-12

## 2023-12-12 NOTE — TELEPHONE ENCOUNTER
Caller: LidiaJessica    Relationship: Self    Best call back number: [phone]    Requested Prescriptions:   Requested Prescriptions     Pending Prescriptions Disp Refills    carvedilol (COREG) 25 MG tablet 180 tablet 3     Sig: Take 1 tablet by mouth 2 (Two) Times a Day.        Pharmacy where request should be sent: [facility]/PHARMACY #[id number] - [address] [phone] [place] [phone]      Last office visit with prescribing clinician: 10/28/2022   Last telemedicine visit with prescribing clinician: Visit date not found   Next office visit with prescribing clinician: Visit date not found     Additional details provided by patient:     Does the patient have less than a 3 day supply:  [x] Yes  [] No    Would you like a call back once the refill request has been completed: [x] Yes [] No    If the office needs to give you a call back, can they leave a voicemail: [x] Yes [] No    Kaila Avitia Rep   12/12/23 08:46 EST       
HUB TO READ-    Seeing PRN. Defer to PCP.   
Detail Level: Detailed

## 2024-02-11 DIAGNOSIS — I10 ESSENTIAL HYPERTENSION: Chronic | ICD-10-CM

## 2024-02-12 ENCOUNTER — TRANSCRIBE ORDERS (OUTPATIENT)
Dept: ADMINISTRATIVE | Facility: HOSPITAL | Age: 69
End: 2024-02-12
Payer: MEDICARE

## 2024-02-12 DIAGNOSIS — R53.83 FATIGUE, UNSPECIFIED TYPE: ICD-10-CM

## 2024-02-12 DIAGNOSIS — R30.0 DYSURIA: Primary | ICD-10-CM

## 2024-02-12 RX ORDER — LISINOPRIL AND HYDROCHLOROTHIAZIDE 20; 12.5 MG/1; MG/1
TABLET ORAL
Qty: 180 TABLET | Refills: 2 | OUTPATIENT
Start: 2024-02-12

## 2024-05-16 DIAGNOSIS — I10 ESSENTIAL HYPERTENSION: Chronic | ICD-10-CM

## 2024-05-17 RX ORDER — LISINOPRIL AND HYDROCHLOROTHIAZIDE 20; 12.5 MG/1; MG/1
TABLET ORAL
Qty: 180 TABLET | Refills: 2 | OUTPATIENT
Start: 2024-05-17

## 2024-06-08 DIAGNOSIS — I10 ESSENTIAL HYPERTENSION: Chronic | ICD-10-CM

## 2024-06-10 RX ORDER — LISINOPRIL AND HYDROCHLOROTHIAZIDE 20; 12.5 MG/1; MG/1
TABLET ORAL
Qty: 180 TABLET | Refills: 2 | OUTPATIENT
Start: 2024-06-10

## 2024-07-20 DIAGNOSIS — I10 ESSENTIAL HYPERTENSION: Chronic | ICD-10-CM

## 2024-07-22 RX ORDER — LISINOPRIL AND HYDROCHLOROTHIAZIDE 20; 12.5 MG/1; MG/1
TABLET ORAL
Qty: 180 TABLET | Refills: 2 | OUTPATIENT
Start: 2024-07-22

## 2024-08-09 ENCOUNTER — HOSPITAL ENCOUNTER (EMERGENCY)
Facility: HOSPITAL | Age: 69
Discharge: HOME | End: 2024-08-09
Attending: STUDENT IN AN ORGANIZED HEALTH CARE EDUCATION/TRAINING PROGRAM
Payer: MEDICARE

## 2024-08-09 VITALS
HEART RATE: 69 BPM | RESPIRATION RATE: 16 BRPM | OXYGEN SATURATION: 98 % | HEIGHT: 71 IN | TEMPERATURE: 98.1 F | SYSTOLIC BLOOD PRESSURE: 129 MMHG | DIASTOLIC BLOOD PRESSURE: 80 MMHG | BODY MASS INDEX: 30.81 KG/M2 | WEIGHT: 220.1 LBS

## 2024-08-09 DIAGNOSIS — L23.7 POISON SUMAC: Primary | ICD-10-CM

## 2024-08-09 PROCEDURE — 99283 EMERGENCY DEPT VISIT LOW MDM: CPT

## 2024-08-09 PROCEDURE — 63710000001 PREDNISONE PER 1 MG: Performed by: PHYSICIAN ASSISTANT

## 2024-08-09 RX ORDER — PREDNISONE 20 MG/1
40 TABLET ORAL ONCE
Status: COMPLETED | OUTPATIENT
Start: 2024-08-09 | End: 2024-08-09

## 2024-08-09 RX ORDER — PREDNISONE 10 MG/1
TABLET ORAL
Qty: 48 EACH | Refills: 0 | Status: SHIPPED | OUTPATIENT
Start: 2024-08-09

## 2024-08-09 RX ADMIN — PREDNISONE 40 MG: 20 TABLET ORAL at 01:53

## 2024-08-13 NOTE — ED PROVIDER NOTES
Subjective   History of Present Illness  69-year-old female presents with a rash from poison ivy or poison sumac, the right upper extremity, it spreading closer to her shoulder and upper arm area, red rash with blisters and itching.    History provided by:  Patient   used: No        Review of Systems   Skin:  Positive for rash.   All other systems reviewed and are negative.      Past Medical History:   Diagnosis Date    Acute kidney injury 08/08/2019    Arthritis     History of blood clots     Hypertension 05/22/2018    Kidney disorder     Kidney stone 05/22/2018    Osteoporosis     Ovarian cyst     Renal disorder 05/22/2018    Thyroid disorder        Allergies   Allergen Reactions    Haemophilus Influenzae Vaccines Other (See Comments)     Site reaction    Sulfa Antibiotics Nausea Only    Ceclor [Cefaclor] Rash       Past Surgical History:   Procedure Laterality Date    CARDIAC CATHETERIZATION N/A 08/09/2019    Procedure: Right Heart Cath;  Surgeon: Ousmane Napier MD;  Location: [facility] CATH INVASIVE LOCATION;  Service: Cardiovascular    CARDIAC CATHETERIZATION  08/09/2019    Procedure: Percutaneous Manual Thrombectomy;  Surgeon: Ousmane Napier MD;  Location: [facility] CATH INVASIVE LOCATION;  Service: Cardiovascular    CHOLECYSTECTOMY      KIDNEY STONE SURGERY      NECK SURGERY      SKIN BIOPSY         Family History   Problem Relation Age of Onset    Aneurysm Mother     No Known Problems Father     Arthritis Other     Hyperlipidemia Other     Hypertension Other     Obesity Other     Osteoporosis Other        Social History     Socioeconomic History    Marital status:    Tobacco Use    Smoking status: Never    Smokeless tobacco: Never   Substance and Sexual Activity    Alcohol use: No    Drug use: Yes     Types: Marijuana    Sexual activity: Defer           Objective   Physical Exam  Vitals and nursing note reviewed.   Constitutional:       Appearance: She is  well-developed.   HENT:      Head: Normocephalic and atraumatic.   Cardiovascular:      Rate and Rhythm: Normal rate and regular rhythm.   Abdominal:      Palpations: Abdomen is soft.   Musculoskeletal:         General: Normal range of motion.      Cervical back: Normal range of motion and neck supple.   Skin:     General: Skin is warm and dry.      Findings: Rash present.   Neurological:      Mental Status: She is alert and oriented to person, place, and time.      Deep Tendon Reflexes: Reflexes are normal and symmetric.         Procedures           ED Course                                             Medical Decision Making  Problems Addressed:  Poison sumac: complicated acute illness or injury    Risk  Prescription drug management.        Final diagnoses:   Poison sumac       ED Disposition  ED Disposition       ED Disposition   Discharge    Condition   Stable    Comment   --               Makenzie Ledezma, APRN  [address]  [address]  [place] [id number] [phone]    Schedule an appointment as soon as possible for a visit       [facility] EMERGENCY DEPARTMENT  [address] [facility] [address] [phone]    If symptoms worsen         Medication List        New Prescriptions      predniSONE 10 MG (48) dose pack  Commonly known as: DELTASONE  As directed               Where to Get Your Medications        These medications were sent to [facility]/pharmacy #[id number] - [place], [place] - [address] [place] [address] - [phone]  - [phone]   [address] [facility] [address]      Phone: [phone]   predniSONE 10 MG (48) dose pack            Edilson Neal Jr., PA-C  08/13/24 9546

## (undated) DEVICE — GW THRUWAY SHRT TPR STR 300CM

## (undated) DEVICE — NDL ART WING 18G 7CM

## (undated) DEVICE — ANGIOGRAPHIC CATHETER: Brand: EXPO™

## (undated) DEVICE — CATH THERMODIL HEP 4L 7F 110CM

## (undated) DEVICE — INTRO SHEATH DRYSEAL FLEX 22F 7.3TO7.3MM 33CM

## (undated) DEVICE — PINNACLE INTRODUCER SHEATH: Brand: PINNACLE

## (undated) DEVICE — CAP FLOW TRIEVER INARI MEDICAL